# Patient Record
Sex: FEMALE | Race: WHITE | Employment: UNEMPLOYED | URBAN - METROPOLITAN AREA
[De-identification: names, ages, dates, MRNs, and addresses within clinical notes are randomized per-mention and may not be internally consistent; named-entity substitution may affect disease eponyms.]

---

## 2022-06-10 ENCOUNTER — OFFICE VISIT (OUTPATIENT)
Dept: FAMILY MEDICINE CLINIC | Age: 22
End: 2022-06-10
Payer: MEDICAID

## 2022-06-10 VITALS — TEMPERATURE: 98.9 F | HEART RATE: 57 BPM | OXYGEN SATURATION: 98 %

## 2022-06-10 DIAGNOSIS — K64.9 ACUTE HEMORRHOID: Primary | ICD-10-CM

## 2022-06-10 PROCEDURE — 99213 OFFICE O/P EST LOW 20 MIN: CPT

## 2022-06-10 PROCEDURE — G8419 CALC BMI OUT NRM PARAM NOF/U: HCPCS

## 2022-06-10 PROCEDURE — G8427 DOCREV CUR MEDS BY ELIG CLIN: HCPCS

## 2022-06-10 PROCEDURE — 1036F TOBACCO NON-USER: CPT

## 2022-06-10 RX ORDER — LIDOCAINE HCL AND HYDROCORTISONE ACETATE 20; 20 MG/G; MG/G
1 CREAM RECTAL 2 TIMES DAILY PRN
Qty: 1 KIT | Refills: 1 | Status: SHIPPED | OUTPATIENT
Start: 2022-06-10

## 2022-06-10 ASSESSMENT — ENCOUNTER SYMPTOMS: BLOOD IN STOOL: 1

## 2022-06-10 NOTE — PROGRESS NOTES
[facility] [address] Encounter        ASSESSMENT/PLAN     Melani Palmer is a 25 y.o. female who presents with: Complaint of traces of blood in stool that is bright red. Blood is present on the stool does not discolor the water in the bowl of the toilet. Patient gave birth approximately 4 weeks ago did have issues with hemorrhoids during pregnancy. She has not used any treatments. With chaperone present patient rectum is examined there is no external hemorrhoid noted appearance of trace Blood near the rectum suspicion for internal bleeding hemorrhoid. Though none is palpated. 1. Acute hemorrhoid      Orders placed for rectal hydrocortisone cream.  Patient educated to use twice daily. Also educated to use Metamucil or Benefiber daily to help soften stool. She has follow-up with her midwife in 1 week advised her to discuss condition at that time. She is to return if she detects large volumes of blood in the stool or toilet. PATIENT REFERRED TO:  Return if symptoms worsen or fail to improve. DISCHARGE MEDICATIONS:  New Prescriptions    LIDOCAINE-HYDROCORTISONE ACE 2-2 % KIT    Place 1 Applicatorful rectally 2 times daily as needed (For rectal pain and itching) Use sparingly     Cannot display discharge medications since this is not an admission. Mojgan Lama, APRN - CNP    CHIEF COMPLAINT       Chief Complaint   Patient presents with    Rectal Bleeding     after BM bright red         SUBJECTIVE/REVIEW OF SYSTEMS     Review of Systems   Constitutional: Negative for fatigue and fever. Respiratory: Negative for shortness of breath. Cardiovascular: Negative for chest pain. Gastrointestinal: Positive for blood in stool and rectal pain. Negative for abdominal distention, anal bleeding, constipation and diarrhea. Genitourinary: Negative for dyspareunia, hematuria, vaginal bleeding and vaginal pain. Musculoskeletal: Negative. Skin: Negative. Neurological: Negative. Hematological: Does not bruise/bleed easily. Psychiatric/Behavioral: Negative for confusion. OBJECTIVE/PHYSICAL EXAM     Physical Exam  Exam conducted with a chaperone present. Constitutional:       General: She is not in acute distress. Appearance: Normal appearance. She is not diaphoretic. Cardiovascular:      Rate and Rhythm: Normal rate. Pulmonary:      Effort: Pulmonary effort is normal.   Abdominal:      General: Abdomen is flat. There is no distension. Palpations: Abdomen is soft. Tenderness: There is no abdominal tenderness. There is no guarding. Genitourinary:     Vagina: No vaginal discharge. Rectum: Internal hemorrhoid present. No mass or external hemorrhoid. Skin:     General: Skin is warm and dry. Neurological:      Mental Status: She is alert. Psychiatric:         Mood and Affect: Mood normal.         Behavior: Behavior normal.         VITALS   , Temp: 98.9 °F (37.2 °C),  , Heart Rate: 57,  , SpO2: 98 %      PAST MEDICAL HISTORY         Diagnosis Date    Depression     IBS (irritable bowel syndrome)     Pelvic congestive syndrome      SURGICAL HISTORY     Patient  has no past surgical history on file. CURRENT MEDICATIONS       Previous Medications    No medications on file     ALLERGIES     Patient is is allergic to iodine. FAMILY HISTORY     Patient'sfamily history includes Cancer in her father; No Known Problems in her mother. HISTORY     Patient  reports that she quit smoking about 2 years ago. Her smoking use included cigarettes. She has a 3.00 pack-year smoking history. She has never used smokeless tobacco. She reports that she does not drink alcohol and does not use drugs. READY CARE COURSE   No orders of the defined types were placed in this encounter. Labs:  No results found for this visit on 06/10/22. IMAGING:  No orders to display     Scheduled Meds:  Continuous Infusions:  PRN Meds:.

## 2022-06-10 NOTE — PATIENT INSTRUCTIONS
Patient Education        Hemorroides internas y externas: [address] [facility] anatómico  Internal and External Hemorrhoids: Anatomy Sketch    Revisado: 8 septiembre, 2021               Versión del contenido: 13.2  © 2006-2022 [employer]. Las instrucciones de cuidado fueron adaptadas bajo licencia por [facility] ([facility]). Si usted tiene [place] afección médica o sobre estas instrucciones, siempre pregunte a serrano profesional de cindy. [employer] niega toda garantía o responsabilidad por serrano uso de esta información. I recommend using a daily fiber such as Metamucil or Benefiber to help soften the stool. Use rectal cream twice daily and then reduce to once daily.   If symptoms do not resolve in 1 week return for follow-up or I will see her primary care

## 2022-06-11 ASSESSMENT — ENCOUNTER SYMPTOMS
RECTAL PAIN: 1
ANAL BLEEDING: 0
SHORTNESS OF BREATH: 0
DIARRHEA: 0
ABDOMINAL DISTENTION: 0
CONSTIPATION: 0

## 2022-07-17 ENCOUNTER — OFFICE VISIT (OUTPATIENT)
Dept: INTERNAL MEDICINE | Age: 22
End: 2022-07-17
Payer: MEDICAID

## 2022-07-17 VITALS
TEMPERATURE: 98.3 F | HEART RATE: 72 BPM | OXYGEN SATURATION: 98 % | BODY MASS INDEX: 28.41 KG/M2 | WEIGHT: 181 LBS | SYSTOLIC BLOOD PRESSURE: 122 MMHG | HEIGHT: 67 IN | DIASTOLIC BLOOD PRESSURE: 68 MMHG

## 2022-07-17 DIAGNOSIS — B37.9 YEAST INFECTION: Primary | ICD-10-CM

## 2022-07-17 PROCEDURE — 99203 OFFICE O/P NEW LOW 30 MIN: CPT | Performed by: NURSE PRACTITIONER

## 2022-07-17 RX ORDER — FLUCONAZOLE 150 MG/1
150 TABLET ORAL ONCE
Qty: 1 TABLET | Refills: 0 | Status: SHIPPED | OUTPATIENT
Start: 2022-07-17 | End: 2022-07-17

## 2022-07-17 RX ORDER — HYDROCORTISONE 25 MG/G
CREAM TOPICAL
COMMUNITY
Start: 2022-06-10

## 2022-07-17 ASSESSMENT — PATIENT HEALTH QUESTIONNAIRE - PHQ9
3. TROUBLE FALLING OR STAYING ASLEEP: 0
2. FEELING DOWN, DEPRESSED OR HOPELESS: 0
SUM OF ALL RESPONSES TO PHQ QUESTIONS 1-9: 0
1. LITTLE INTEREST OR PLEASURE IN DOING THINGS: 0
SUM OF ALL RESPONSES TO PHQ QUESTIONS 1-9: 0
SUM OF ALL RESPONSES TO PHQ QUESTIONS 1-9: 0
4. FEELING TIRED OR HAVING LITTLE ENERGY: 0
7. TROUBLE CONCENTRATING ON THINGS, SUCH AS READING THE NEWSPAPER OR WATCHING TELEVISION: 0
SUM OF ALL RESPONSES TO PHQ QUESTIONS 1-9: 0
9. THOUGHTS THAT YOU WOULD BE BETTER OFF DEAD, OR OF HURTING YOURSELF: 0
5. POOR APPETITE OR OVEREATING: 0
10. IF YOU CHECKED OFF ANY PROBLEMS, HOW DIFFICULT HAVE THESE PROBLEMS MADE IT FOR YOU TO DO YOUR WORK, TAKE CARE OF THINGS AT HOME, OR GET ALONG WITH OTHER PEOPLE: 0
SUM OF ALL RESPONSES TO PHQ9 QUESTIONS 1 & 2: 0
6. FEELING BAD ABOUT YOURSELF - OR THAT YOU ARE A FAILURE OR HAVE LET YOURSELF OR YOUR FAMILY DOWN: 0
8. MOVING OR SPEAKING SO SLOWLY THAT OTHER PEOPLE COULD HAVE NOTICED. OR THE OPPOSITE, BEING SO FIGETY OR RESTLESS THAT YOU HAVE BEEN MOVING AROUND A LOT MORE THAN USUAL: 0

## 2022-07-17 ASSESSMENT — ENCOUNTER SYMPTOMS
DIARRHEA: 0
NAUSEA: 0
ABDOMINAL PAIN: 0
SHORTNESS OF BREATH: 0
VOMITING: 0
BACK PAIN: 0
COUGH: 0
WHEEZING: 0

## 2022-07-17 NOTE — PROGRESS NOTES
Mae De Los Santos (:  2000) is a 25 y.o. female, New patient, here for evaluation of the following chief complaint(s):  Vaginitis (Irritation, discharge ongoing 2 days)      Vitals:    22 1025   BP: 122/68   Pulse: 72   Temp: 98.3 °F (36.8 °C)   SpO2: 98%       ASSESSMENT/PLAN:  1. Yeast infection  -     fluconazole (DIFLUCAN) 150 MG tablet; Take 1 tablet by mouth once for 1 dose, Disp-1 tablet, R-0Normal        -     pt breastfeeding, advised diflucan is present in breast milk but RID is <10%. Discussed pump and dump for day as option to decrease RID (if she's more comfortable with that)    Return if symptoms worsen or fail to improve. SUBJECTIVE/OBJECTIVE:    Vaginal Discharge  The patient's primary symptoms include vaginal discharge. This is a new problem. Episode onset: x2 days, vaginal irritation and white clumpy discharge. The problem occurs constantly. The problem has been gradually worsening. The pain is mild. She is not pregnant. Pertinent negatives include no abdominal pain, back pain, chills, diarrhea, dysuria, fever, flank pain, frequency, nausea, urgency or vomiting. The vaginal discharge was thick and white. There has been no bleeding. She is sexually active. No, her partner does not have an STD. Review of Systems   Constitutional:  Negative for chills, fatigue and fever. Respiratory:  Negative for cough, shortness of breath and wheezing. Cardiovascular:  Negative for chest pain and palpitations. Gastrointestinal:  Negative for abdominal pain, diarrhea, nausea and vomiting. Genitourinary:  Positive for vaginal discharge and vaginal pain (irritation). Negative for dysuria, flank pain, frequency and urgency. Musculoskeletal:  Negative for arthralgias, back pain and myalgias. Physical Exam  Vitals reviewed. Constitutional:       General: She is not in acute distress. Appearance: Normal appearance.    Cardiovascular:      Rate and Rhythm: Normal rate and regular rhythm. Heart sounds: Normal heart sounds, S1 normal and S2 normal.   Pulmonary:      Effort: Pulmonary effort is normal. No respiratory distress. Breath sounds: Normal air entry. No decreased breath sounds, wheezing, rhonchi or rales. Abdominal:      General: Bowel sounds are normal.      Palpations: Abdomen is soft. Tenderness: There is no abdominal tenderness. There is no right CVA tenderness or left CVA tenderness. Genitourinary:     Comments: White cottage cheese like discharge with vaginal irritation per pt. Exam deferred   Skin:     General: Skin is warm and dry. Neurological:      Mental Status: She is alert and oriented to person, place, and time. Psychiatric:         Behavior: Behavior is cooperative. An electronic signature was used to authenticate this note.     --EDGARD Hernandez

## 2022-12-10 ENCOUNTER — OFFICE VISIT (OUTPATIENT)
Dept: INTERNAL MEDICINE | Age: 22
End: 2022-12-10

## 2022-12-10 VITALS
OXYGEN SATURATION: 99 % | TEMPERATURE: 97.6 F | HEIGHT: 67 IN | SYSTOLIC BLOOD PRESSURE: 118 MMHG | WEIGHT: 185 LBS | DIASTOLIC BLOOD PRESSURE: 74 MMHG | HEART RATE: 71 BPM | BODY MASS INDEX: 29.03 KG/M2

## 2022-12-10 DIAGNOSIS — R39.9 UTI SYMPTOMS: Primary | ICD-10-CM

## 2022-12-10 LAB
BILIRUBIN, POC: NORMAL
BLOOD URINE, POC: NORMAL
CLARITY, POC: NORMAL
COLOR, POC: NORMAL
GLUCOSE URINE, POC: NORMAL
KETONES, POC: NORMAL
LEUKOCYTE EST, POC: NORMAL
NITRITE, POC: NORMAL
PH, POC: 6
PROTEIN, POC: NORMAL
SPECIFIC GRAVITY, POC: 1.02
UROBILINOGEN, POC: NORMAL

## 2022-12-10 SDOH — ECONOMIC STABILITY: FOOD INSECURITY: WITHIN THE PAST 12 MONTHS, THE FOOD YOU BOUGHT JUST DIDN'T LAST AND YOU DIDN'T HAVE MONEY TO GET MORE.: NEVER TRUE

## 2022-12-10 SDOH — ECONOMIC STABILITY: FOOD INSECURITY: WITHIN THE PAST 12 MONTHS, YOU WORRIED THAT YOUR FOOD WOULD RUN OUT BEFORE YOU GOT MONEY TO BUY MORE.: NEVER TRUE

## 2022-12-10 ASSESSMENT — ENCOUNTER SYMPTOMS
VOMITING: 0
SHORTNESS OF BREATH: 0
WHEEZING: 0
DIARRHEA: 0
NAUSEA: 0
COUGH: 0
ABDOMINAL PAIN: 0

## 2022-12-10 ASSESSMENT — SOCIAL DETERMINANTS OF HEALTH (SDOH): HOW HARD IS IT FOR YOU TO PAY FOR THE VERY BASICS LIKE FOOD, HOUSING, MEDICAL CARE, AND HEATING?: NOT HARD AT ALL

## 2022-12-10 NOTE — PROGRESS NOTES
Subjective:      Patient ID: Socorro Juares is a 25 y.o. female who presents today for:  Chief Complaint   Patient presents with    Urinary Tract Infection     Pelvic pain, burning, frequency, had small blood clots in urine, yesterday       Dysuria   This is a new problem. The current episode started yesterday. The problem has been resolved. The quality of the pain is described as burning, shooting and aching. The pain is moderate. There has been no fever. Associated symptoms include frequency and hematuria. Pertinent negatives include no chills, discharge, flank pain, hesitancy, nausea, possible pregnancy, sweats, urgency or vomiting. She has tried nothing for the symptoms. There is no history of kidney stones or recurrent UTIs. Past Medical History:   Diagnosis Date    Depression     IBS (irritable bowel syndrome)     Pelvic congestive syndrome      History reviewed. No pertinent surgical history. Family History   Problem Relation Age of Onset    No Known Problems Mother     Cancer Father      Allergies   Allergen Reactions    Iodine Hives and Itching     Facial hives, pruritus            Review of Systems   Constitutional:  Negative for chills, fatigue and fever. Respiratory:  Negative for cough, shortness of breath and wheezing. Cardiovascular:  Negative for chest pain. Gastrointestinal:  Negative for abdominal pain, diarrhea, nausea and vomiting. Genitourinary:  Positive for dysuria, frequency, hematuria and pelvic pain. Negative for flank pain, hesitancy and urgency. Objective:   /74 (Site: Right Upper Arm, Position: Sitting, Cuff Size: Medium Adult)   Pulse 71   Temp 97.6 °F (36.4 °C) (Infrared)   Ht 5' 7\" (1.702 m)   Wt 185 lb (83.9 kg)   SpO2 99%   BMI 28.98 kg/m²     Physical Exam  Vitals reviewed. Constitutional:       General: She is not in acute distress. Appearance: She is well-developed. She is not ill-appearing. HENT:      Head: Normocephalic. Cardiovascular:      Rate and Rhythm: Normal rate and regular rhythm. Heart sounds: Normal heart sounds. Pulmonary:      Effort: Pulmonary effort is normal. No respiratory distress. Breath sounds: Normal breath sounds. Abdominal:      General: Bowel sounds are normal.      Palpations: Abdomen is soft. Tenderness: There is no abdominal tenderness. There is no right CVA tenderness or left CVA tenderness. Musculoskeletal:         General: Normal range of motion. Skin:     General: Skin is warm and dry. Neurological:      Mental Status: She is alert and oriented to person, place, and time. Assessment:       Diagnosis Orders   1. UTI symptoms  POCT Urinalysis No Micro (Auto)    Culture, Urine            Plan:      Orders Placed This Encounter   Procedures    Culture, Urine     Standing Status:   Future     Standing Expiration Date:   12/10/2023     Order Specific Question:   Specify (ex-cath, midstream, cysto, etc)? Answer:   Midstream    POCT Urinalysis No Micro (Auto)     UA does not confirm a UTI. Will treat upon culture results as indicated. Patient is not experiencing symptoms at this time. Reviewed symptoms requiring follow up/ER. Patient verbalizes understanding. I have reviewed and updated the electronic medical record. Return if symptoms worsen or fail to improve, for follow up with PCP.     EDGARD Iglesias NP

## 2022-12-11 DIAGNOSIS — R39.9 UTI SYMPTOMS: ICD-10-CM

## 2022-12-13 LAB
ORGANISM: ABNORMAL
URINE CULTURE, ROUTINE: ABNORMAL
URINE CULTURE, ROUTINE: ABNORMAL

## 2022-12-15 RX ORDER — NITROFURANTOIN 25; 75 MG/1; MG/1
100 CAPSULE ORAL 2 TIMES DAILY
Qty: 10 CAPSULE | Refills: 0 | Status: SHIPPED | OUTPATIENT
Start: 2022-12-15 | End: 2022-12-20

## 2023-02-03 SDOH — HEALTH STABILITY: PHYSICAL HEALTH: ON AVERAGE, HOW MANY DAYS PER WEEK DO YOU ENGAGE IN MODERATE TO STRENUOUS EXERCISE (LIKE A BRISK WALK)?: 0 DAYS

## 2023-02-03 SDOH — HEALTH STABILITY: PHYSICAL HEALTH: ON AVERAGE, HOW MANY MINUTES DO YOU ENGAGE IN EXERCISE AT THIS LEVEL?: 0 MIN

## 2023-02-06 ENCOUNTER — OFFICE VISIT (OUTPATIENT)
Dept: INTERNAL MEDICINE | Age: 23
End: 2023-02-06
Payer: MEDICAID

## 2023-02-06 VITALS
OXYGEN SATURATION: 99 % | SYSTOLIC BLOOD PRESSURE: 114 MMHG | HEART RATE: 71 BPM | RESPIRATION RATE: 16 BRPM | TEMPERATURE: 97.2 F | BODY MASS INDEX: 29.73 KG/M2 | WEIGHT: 189.4 LBS | HEIGHT: 67 IN | DIASTOLIC BLOOD PRESSURE: 88 MMHG

## 2023-02-06 DIAGNOSIS — R07.9 CHEST PAIN, UNSPECIFIED TYPE: ICD-10-CM

## 2023-02-06 DIAGNOSIS — R10.2 CHRONIC SUPRAPUBIC PAIN: Primary | ICD-10-CM

## 2023-02-06 DIAGNOSIS — K59.00 CONSTIPATION, UNSPECIFIED CONSTIPATION TYPE: ICD-10-CM

## 2023-02-06 DIAGNOSIS — N94.89 PELVIC CONGESTION SYNDROME: ICD-10-CM

## 2023-02-06 DIAGNOSIS — G89.29 CHRONIC SUPRAPUBIC PAIN: Primary | ICD-10-CM

## 2023-02-06 DIAGNOSIS — M62.89 MUSCLE TIGHTNESS: ICD-10-CM

## 2023-02-06 LAB
BILIRUBIN, POC: NORMAL
BLOOD URINE, POC: NORMAL
CLARITY, POC: CLEAR
COLOR, POC: NORMAL
GLUCOSE URINE, POC: NORMAL
KETONES, POC: NORMAL
LEUKOCYTE EST, POC: NORMAL
NITRITE, POC: NORMAL
PH, POC: 6.5
PROTEIN, POC: NORMAL
SPECIFIC GRAVITY, POC: 1.02
UROBILINOGEN, POC: 0.2

## 2023-02-06 PROCEDURE — G8427 DOCREV CUR MEDS BY ELIG CLIN: HCPCS | Performed by: PHYSICIAN ASSISTANT

## 2023-02-06 PROCEDURE — 81003 URINALYSIS AUTO W/O SCOPE: CPT | Performed by: PHYSICIAN ASSISTANT

## 2023-02-06 PROCEDURE — 93000 ELECTROCARDIOGRAM COMPLETE: CPT | Performed by: PHYSICIAN ASSISTANT

## 2023-02-06 PROCEDURE — 99203 OFFICE O/P NEW LOW 30 MIN: CPT | Performed by: PHYSICIAN ASSISTANT

## 2023-02-06 PROCEDURE — 1036F TOBACCO NON-USER: CPT | Performed by: PHYSICIAN ASSISTANT

## 2023-02-06 PROCEDURE — G8419 CALC BMI OUT NRM PARAM NOF/U: HCPCS | Performed by: PHYSICIAN ASSISTANT

## 2023-02-06 PROCEDURE — G8484 FLU IMMUNIZE NO ADMIN: HCPCS | Performed by: PHYSICIAN ASSISTANT

## 2023-02-06 SDOH — ECONOMIC STABILITY: FOOD INSECURITY: WITHIN THE PAST 12 MONTHS, YOU WORRIED THAT YOUR FOOD WOULD RUN OUT BEFORE YOU GOT MONEY TO BUY MORE.: NEVER TRUE

## 2023-02-06 SDOH — ECONOMIC STABILITY: INCOME INSECURITY: HOW HARD IS IT FOR YOU TO PAY FOR THE VERY BASICS LIKE FOOD, HOUSING, MEDICAL CARE, AND HEATING?: NOT HARD AT ALL

## 2023-02-06 SDOH — ECONOMIC STABILITY: FOOD INSECURITY: WITHIN THE PAST 12 MONTHS, THE FOOD YOU BOUGHT JUST DIDN'T LAST AND YOU DIDN'T HAVE MONEY TO GET MORE.: NEVER TRUE

## 2023-02-06 SDOH — ECONOMIC STABILITY: HOUSING INSECURITY
IN THE LAST 12 MONTHS, WAS THERE A TIME WHEN YOU DID NOT HAVE A STEADY PLACE TO SLEEP OR SLEPT IN A SHELTER (INCLUDING NOW)?: NO

## 2023-02-06 ASSESSMENT — PATIENT HEALTH QUESTIONNAIRE - PHQ9
SUM OF ALL RESPONSES TO PHQ9 QUESTIONS 1 & 2: 0
SUM OF ALL RESPONSES TO PHQ QUESTIONS 1-9: 0
SUM OF ALL RESPONSES TO PHQ QUESTIONS 1-9: 0
1. LITTLE INTEREST OR PLEASURE IN DOING THINGS: 0
SUM OF ALL RESPONSES TO PHQ QUESTIONS 1-9: 0
2. FEELING DOWN, DEPRESSED OR HOPELESS: 0
SUM OF ALL RESPONSES TO PHQ QUESTIONS 1-9: 0

## 2023-02-06 NOTE — PROGRESS NOTES
Yong Riggins (: 2000) is a 25 y.o. female, Established patient, here for evaluation of the following chief complaint(s):  New Patient (Est care, pelvic pain hurts all the time, joint pain (knees down to ankles), chest pain issues for a while but it has been more frequent recently)        ASSESSMENT/PLAN:  1. Chronic suprapubic pain  2. Pelvic congestion syndrome  3. Constipation, unspecified constipation type  - POCT Urinalysis No Micro (Auto)- neg   - 2 providers have been unsure what is causing her pain, constipation vs pelvic congestion syndrome   - advised her to start Metamucil regimen with increased water intake. Can us Miralax if needed. When BM are normal , and pain continues, she should see the gyn again from treatment      4. Chest pain, unspecified type  - EKG 12 lead-  NSR with short TX interval . Will have her see the cardiologist   - Ambulatory referral to Cardiology    5. Muscle tightness  - normal exam of the knes and ankles  - advised yoga or piliates, or just stretching          No follow-ups on file. SUBJECTIVE/OBJECTIVE:  HPI      Suprapubic pain x 1 yr  GYN visit , Had US , was normal   GI saw her- diagnosed with IBS-C  Then had CT, showed pelvic congestion syndrome    Unsure if the pain is due to pelvic congestion or the constipation   She is nursing       Joint pain /muscle  Knees to the ankles   Just feels tight     Chest pain   For months, but now getting more frequent   Last a few minutes, no SOB    Can be without exertion       Review of Systems   Constitutional: Negative. HENT: Negative. Respiratory: Negative. Cardiovascular:  Positive for chest pain. Negative for palpitations and leg swelling. Gastrointestinal:  Positive for abdominal pain (suprapubic). Musculoskeletal:  Positive for arthralgias and myalgias. Negative for gait problem and joint swelling. Neurological: Negative. Hematological: Negative. Psychiatric/Behavioral: Negative.      All other systems reviewed and are negative. Physical Exam  Vitals reviewed. Constitutional:       Appearance: Normal appearance. Cardiovascular:      Rate and Rhythm: Normal rate and regular rhythm. Heart sounds: Normal heart sounds. Pulmonary:      Effort: Pulmonary effort is normal.      Breath sounds: Normal breath sounds. Abdominal:      General: Bowel sounds are normal.      Tenderness: There is abdominal tenderness (suprapubic). Musculoskeletal:      Right knee: Normal.      Left knee: Normal.      Right ankle: Normal.      Left ankle: Normal.   Neurological:      Mental Status: She is alert. Vitals:    02/06/23 1007   BP: 114/88   Site: Right Upper Arm   Position: Sitting   Cuff Size: Medium Adult   Pulse: 71   Resp: 16   Temp: 97.2 °F (36.2 °C)   SpO2: 99%   Weight: 189 lb 6.4 oz (85.9 kg)   Height: 5' 7\" (1.702 m)                 An electronic signature was used to authenticate this note.     --HELEN Richards

## 2023-02-07 ASSESSMENT — ENCOUNTER SYMPTOMS
RESPIRATORY NEGATIVE: 1
ABDOMINAL PAIN: 1

## 2023-02-21 ENCOUNTER — OFFICE VISIT (OUTPATIENT)
Dept: CARDIOLOGY CLINIC | Age: 23
End: 2023-02-21
Payer: MEDICAID

## 2023-02-21 VITALS
DIASTOLIC BLOOD PRESSURE: 80 MMHG | BODY MASS INDEX: 29.82 KG/M2 | SYSTOLIC BLOOD PRESSURE: 110 MMHG | HEART RATE: 87 BPM | OXYGEN SATURATION: 99 % | WEIGHT: 190 LBS | HEIGHT: 67 IN

## 2023-02-21 DIAGNOSIS — R07.9 CHEST PAIN, UNSPECIFIED TYPE: Primary | ICD-10-CM

## 2023-02-21 PROCEDURE — 93000 ELECTROCARDIOGRAM COMPLETE: CPT | Performed by: INTERNAL MEDICINE

## 2023-02-21 PROCEDURE — G8484 FLU IMMUNIZE NO ADMIN: HCPCS | Performed by: INTERNAL MEDICINE

## 2023-02-21 PROCEDURE — 99204 OFFICE O/P NEW MOD 45 MIN: CPT | Performed by: INTERNAL MEDICINE

## 2023-02-21 PROCEDURE — 4004F PT TOBACCO SCREEN RCVD TLK: CPT | Performed by: INTERNAL MEDICINE

## 2023-02-21 PROCEDURE — G8427 DOCREV CUR MEDS BY ELIG CLIN: HCPCS | Performed by: INTERNAL MEDICINE

## 2023-02-21 PROCEDURE — G8419 CALC BMI OUT NRM PARAM NOF/U: HCPCS | Performed by: INTERNAL MEDICINE

## 2023-02-21 NOTE — PROGRESS NOTES
2023    EDGARD Espinoza CNP  [address] [place] [address]    RE: Prieto Yoder   : 2000     Dear EDGARD Minor CNP :    I had the pleasure of seeing your patient, Prieto Yoder in the office today on 2023. As you are well aware, Ms. Sydney Vincent is a pleasant 25 y.o.  female with history of tobacco abuse and recent pregnancy 9 who was kindly referred to me for evaluation of chest pain. Currently, the patient reports a left sided chest pain described as a \"sharp/stabbing\"sensation over the last month. Episodes can happen almost every day. The pain can last between 2 seconds and 2 minutes. The discomfort radiates down the left lateral chest wall. The pain causes shortness of breath due to breathing shallow. There is no associated nausea, vomiting, paresthesias or diaphoresis. No significant palpitations are reported. No near syncope or syncope. She reports dyspnea with more physical degrees of activity. No prior history of heart disease. Past Medical History: She  has a past medical history of Depression, IBS (irritable bowel syndrome), Pelvic congestive syndrome, and Urinary incontinence. Surgical History: None. Social History: She  reports that she has been smoking cigarettes. She has a 1.50 pack-year smoking history. She has never used smokeless tobacco. She reports that she does not drink alcohol and does not use drugs. She drink 12 oz coffee per day. Family History: Brother with heart murmur. Maternal grandmother with unspecified heart disease and CVA. Maternal grandfather with history of amyloid heart disease and pacemaker. Current medications:   No current outpatient medications on file. No current facility-administered medications for this visit. Allergies: Iodine     Review of Systems   General: Fatigued. Cardiovascular: See HPI. No orthopnea or PND.    Respiratory: Dyspnea is present with mild to moderate degrees of activity. Gastrointestinal: No melena or hematochezia. Genitourinary: No hematuria. Hematological: No easy bruising or bleeding. Vascular: No lower extremity edema or claudication. Neurological: No TIA or CVA symptoms. No paresthesias. Musculoskeletal: No chest wall trauma. Psychiatric: No anxiety. + depression   *All other systems were reviewed and found to be negative unless otherwise noted in the HPI*    Physical Examination: Her  height is 5' 7\" (1.702 m) and weight is 190 lb (86.2 kg). Her blood pressure is 110/80 and her pulse is 87. Her oxygen saturation is 99%. .  She is alert and oriented to person, place, and time. No distress. Head is atraumatic. Moist mucous membranes. Neck is supple without JVD or carotid bruits. No thyroidmegaly. Lungs are clear to auscultation both anteriorly and posteriorly. No accessory muscle usage. Heart is a regular rate and rhythm. No significant murmur. No S3 or S4. PMI is nondisplaced. Abdomen is soft and non tender. No hepatosplenomegaly. No abdominal aortic bruits or masses. Lower extremities are free of edema. No clubbing or cyanosis. Neurologically, cranial nerves are grossly intact. No focal sensory and/or motor deficits. Skin is intact without rash or lesions. ECG (2/21/2023): Sinus rhythm. HR 66 bpm. Short WV interval of 90 msec. Nonspecific T wave changes. IMPRESSION:  Functional class II- III (mild to moderate activity) dyspnea on exertion, rule out structural heart disease like post partum cardiomyopathy or amyloid heart disease. Precordial pain, atypical. Possible musculokeletal etiology. Tobacco abuse  Family history of amyloid heart disease. Recent pregnancy. RECOMMENDATIONS:  Given her risk factors and symptoms, Ms. Piedad Soriano is recommended an echocardiogram for structural heart evaluation. Her chest pain is most suggestive of musculoskeletal etiology but given recent pregnancy we will make sure there is no other etiology.  She is agreeable to the plan. Follow up cardiac evaluation in 6 weeks. Thank you very much for allowing me to participate in Ms. Haddad's cardiac care. Should you have any questions, please do not hesitate to contact me.      Sincerely,    Selvin Brennan DO, [facility], [address]

## 2023-03-14 ENCOUNTER — OFFICE VISIT (OUTPATIENT)
Dept: INTERNAL MEDICINE | Age: 23
End: 2023-03-14
Payer: MEDICAID

## 2023-03-14 ENCOUNTER — HOSPITAL ENCOUNTER (OUTPATIENT)
Dept: NON INVASIVE DIAGNOSTICS | Age: 23
Discharge: HOME | End: 2023-03-14
Payer: MEDICAID

## 2023-03-14 ENCOUNTER — HOSPITAL ENCOUNTER (OUTPATIENT)
Dept: LAB | Age: 23
Discharge: HOME | End: 2023-03-14
Payer: MEDICAID

## 2023-03-14 VITALS
BODY MASS INDEX: 29.66 KG/M2 | DIASTOLIC BLOOD PRESSURE: 74 MMHG | OXYGEN SATURATION: 99 % | WEIGHT: 189 LBS | TEMPERATURE: 97 F | RESPIRATION RATE: 16 BRPM | HEART RATE: 73 BPM | SYSTOLIC BLOOD PRESSURE: 104 MMHG | HEIGHT: 67 IN

## 2023-03-14 DIAGNOSIS — M25.50 ARTHRALGIA, UNSPECIFIED JOINT: ICD-10-CM

## 2023-03-14 DIAGNOSIS — Z83.49 FAMILY HISTORY OF AMYLOIDOSIS: ICD-10-CM

## 2023-03-14 DIAGNOSIS — M79.18 MYALGIA, MULTIPLE SITES: ICD-10-CM

## 2023-03-14 DIAGNOSIS — R07.9 CHEST PAIN, UNSPECIFIED TYPE: ICD-10-CM

## 2023-03-14 DIAGNOSIS — M25.50 ARTHRALGIA, UNSPECIFIED JOINT: Primary | ICD-10-CM

## 2023-03-14 LAB
ALBUMIN SERPL-MCNC: 4.8 G/DL (ref 3.5–4.6)
ALP BLD-CCNC: 55 U/L (ref 40–130)
ALT SERPL-CCNC: 14 U/L (ref 0–33)
ANION GAP SERPL CALCULATED.3IONS-SCNC: 11 MEQ/L (ref 9–15)
AST SERPL-CCNC: 22 U/L (ref 0–35)
BASOPHILS ABSOLUTE: 0 K/UL (ref 0–0.2)
BASOPHILS RELATIVE PERCENT: 0.7 %
BILIRUB SERPL-MCNC: <0.2 MG/DL (ref 0.2–0.7)
BUN BLDV-MCNC: 10 MG/DL (ref 6–20)
CALCIUM SERPL-MCNC: 9.5 MG/DL (ref 8.5–9.9)
CHLORIDE BLD-SCNC: 107 MEQ/L (ref 95–107)
CO2: 25 MEQ/L (ref 20–31)
CREAT SERPL-MCNC: 0.65 MG/DL (ref 0.5–0.9)
EOSINOPHILS ABSOLUTE: 0.1 K/UL (ref 0–0.7)
EOSINOPHILS RELATIVE PERCENT: 1.3 %
GFR SERPL CREATININE-BSD FRML MDRD: >60 ML/MIN/{1.73_M2}
GLOBULIN: 2.8 G/DL (ref 2.3–3.5)
GLUCOSE BLD-MCNC: 84 MG/DL (ref 70–99)
HCT VFR BLD CALC: 42.4 % (ref 37–47)
HEMOGLOBIN: 14.2 G/DL (ref 12–16)
LV EF: 50 %
LVEF MODALITY: NORMAL
LYMPHOCYTES ABSOLUTE: 2.3 K/UL (ref 1–4.8)
LYMPHOCYTES RELATIVE PERCENT: 35.1 %
MCH RBC QN AUTO: 29.8 PG (ref 27–31.3)
MCHC RBC AUTO-ENTMCNC: 33.4 % (ref 33–37)
MCV RBC AUTO: 89.2 FL (ref 79.4–94.8)
MONOCYTES ABSOLUTE: 0.5 K/UL (ref 0.2–0.8)
MONOCYTES RELATIVE PERCENT: 8.3 %
NEUTROPHILS ABSOLUTE: 3.6 K/UL (ref 1.4–6.5)
NEUTROPHILS RELATIVE PERCENT: 54.6 %
PDW BLD-RTO: 13.2 % (ref 11.5–14.5)
PLATELET # BLD: 249 K/UL (ref 130–400)
POTASSIUM SERPL-SCNC: 4.4 MEQ/L (ref 3.4–4.9)
RBC # BLD: 4.76 M/UL (ref 4.2–5.4)
SODIUM BLD-SCNC: 143 MEQ/L (ref 135–144)
TOTAL PROTEIN: 7.6 G/DL (ref 6.3–8)
WBC # BLD: 6.5 K/UL (ref 4.8–10.8)

## 2023-03-14 PROCEDURE — 4004F PT TOBACCO SCREEN RCVD TLK: CPT | Performed by: PHYSICIAN ASSISTANT

## 2023-03-14 PROCEDURE — 84165 PROTEIN E-PHORESIS SERUM: CPT

## 2023-03-14 PROCEDURE — G8484 FLU IMMUNIZE NO ADMIN: HCPCS | Performed by: PHYSICIAN ASSISTANT

## 2023-03-14 PROCEDURE — 85025 COMPLETE CBC W/AUTO DIFF WBC: CPT

## 2023-03-14 PROCEDURE — 83520 IMMUNOASSAY QUANT NOS NONAB: CPT

## 2023-03-14 PROCEDURE — 99213 OFFICE O/P EST LOW 20 MIN: CPT | Performed by: PHYSICIAN ASSISTANT

## 2023-03-14 PROCEDURE — 84155 ASSAY OF PROTEIN SERUM: CPT

## 2023-03-14 PROCEDURE — G8427 DOCREV CUR MEDS BY ELIG CLIN: HCPCS | Performed by: PHYSICIAN ASSISTANT

## 2023-03-14 PROCEDURE — G8419 CALC BMI OUT NRM PARAM NOF/U: HCPCS | Performed by: PHYSICIAN ASSISTANT

## 2023-03-14 PROCEDURE — 93306 TTE W/DOPPLER COMPLETE: CPT

## 2023-03-14 PROCEDURE — 80053 COMPREHEN METABOLIC PANEL: CPT

## 2023-03-14 PROCEDURE — 36415 COLL VENOUS BLD VENIPUNCTURE: CPT

## 2023-03-14 NOTE — PROGRESS NOTES
lb (85.7 kg)   Height: 5' 7\" (1.702 m)                 An electronic signature was used to authenticate this note.     --HELEN James

## 2023-03-16 LAB
DEPRECATED KAPPA LC FREE/LAMBDA SER: 1.3 {RATIO} (ref 0.26–1.65)
KAPPA LC FREE SER-MCNC: 13.5 MG/L (ref 3.3–19.4)
LAMBDA LC FREE SERPL-MCNC: 10.39 MG/L (ref 5.71–26.3)

## 2023-03-18 LAB
ALBUMIN SERPL-MCNC: 4.8 G/DL (ref 3.75–5.01)
ALPHA1 GLOB SERPL ELPH-MCNC: 0.28 G/DL (ref 0.19–0.46)
ALPHA2 GLOB SERPL ELPH-MCNC: 0.79 G/DL (ref 0.48–1.05)
B-GLOBULIN SERPL ELPH-MCNC: 0.85 G/DL (ref 0.48–1.1)
GAMMA GLOB SERPL ELPH-MCNC: 0.99 G/DL (ref 0.62–1.51)
INTERPRETATION SERPL IFE-IMP: NORMAL
PROT SERPL-MCNC: 7.7 G/DL (ref 6.3–8.2)
PROTEIN ELECTROPHORESIS, SERUM: NORMAL

## 2023-03-21 ENCOUNTER — OFFICE VISIT (OUTPATIENT)
Dept: CARDIOLOGY CLINIC | Age: 23
End: 2023-03-21
Payer: MEDICAID

## 2023-03-21 VITALS
DIASTOLIC BLOOD PRESSURE: 68 MMHG | HEART RATE: 73 BPM | HEIGHT: 67 IN | OXYGEN SATURATION: 100 % | WEIGHT: 189 LBS | BODY MASS INDEX: 29.66 KG/M2 | SYSTOLIC BLOOD PRESSURE: 110 MMHG

## 2023-03-21 DIAGNOSIS — R07.9 CHEST PAIN, UNSPECIFIED TYPE: Primary | ICD-10-CM

## 2023-03-21 PROCEDURE — 4004F PT TOBACCO SCREEN RCVD TLK: CPT | Performed by: INTERNAL MEDICINE

## 2023-03-21 PROCEDURE — 99213 OFFICE O/P EST LOW 20 MIN: CPT | Performed by: INTERNAL MEDICINE

## 2023-03-21 PROCEDURE — G8427 DOCREV CUR MEDS BY ELIG CLIN: HCPCS | Performed by: INTERNAL MEDICINE

## 2023-03-21 PROCEDURE — G8419 CALC BMI OUT NRM PARAM NOF/U: HCPCS | Performed by: INTERNAL MEDICINE

## 2023-03-21 PROCEDURE — G8484 FLU IMMUNIZE NO ADMIN: HCPCS | Performed by: INTERNAL MEDICINE

## 2023-03-21 ASSESSMENT — ENCOUNTER SYMPTOMS: RESPIRATORY NEGATIVE: 1

## 2023-03-21 NOTE — PROGRESS NOTES
otherwise noted in the HPI*    Physical Examination: Her  height is 5' 7\" (1.702 m) and weight is 189 lb (85.7 kg). Her blood pressure is 110/68 and her pulse is 73. Her oxygen saturation is 100%. She is alert and oriented to person, place, and time. No distress. Head is atraumatic. Moist mucous membranes. Neck is supple without JVD or carotid bruits. No thyroidmegaly. Lungs are clear to auscultation both anteriorly and posteriorly. No accessory muscle usage. Heart is a regular rate and rhythm. No significant murmur. No S3 or S4. PMI is nondisplaced. Abdomen is soft and non tender. No hepatosplenomegaly. No abdominal aortic bruits or masses. Lower extremities are free of edema. No clubbing or cyanosis. Neurologically, cranial nerves are grossly intact. No focal sensory and/or motor deficits. Skin is intact without rash or lesions. ECG (3/21/2023): Not done today. Prior office ECG: Sinus rhythm. HR 66 bpm. Short NM interval of 90 msec. Nonspecific T wave changes. Echocardiogram (3/14/2023): EF 50%. Mild mitral regurgitation. Normal wall thickness. Normal RVSP. IMPRESSION:  Dyspnea on exertion. No evidence of postpartum cardiomyopathy or amyloid cardiomyopathy. Normal LV systolic function, EF 26%  Precordial pain, atypical.  Likely musculokeletal etiology. Tobacco abuse  Family history of amyloid heart disease. Recent pregnancy. RECOMMENDATIONS:  Ms. Robert Vega appears to be reasonably stable from a cardiac standpoint. Her chest pain is most suggestive of musculoskeletal etiology. Her echocardiogram shows normal LV function without evidence of amyloid heart disease. She is still concerned about the potential for having amyloidosis. I have recommended she follow-up with your office for further work-up for amyloidosis and/or referral to rheumatology for further evaluation. She is agreeable to the plan. Follow up cardiac evaluation as needed.     Thank you very much for allowing

## 2023-03-30 DIAGNOSIS — Z83.49 FAMILY HISTORY OF AMYLOIDOSIS: ICD-10-CM

## 2023-03-30 DIAGNOSIS — M25.50 ARTHRALGIA, UNSPECIFIED JOINT: Primary | ICD-10-CM

## 2023-03-30 DIAGNOSIS — M79.18 MYALGIA, MULTIPLE SITES: ICD-10-CM

## 2023-04-06 DIAGNOSIS — M79.18 MYALGIA, MULTIPLE SITES: ICD-10-CM

## 2023-04-06 DIAGNOSIS — Z83.49 FAMILY HISTORY OF AMYLOIDOSIS: ICD-10-CM

## 2023-04-06 DIAGNOSIS — M25.50 ARTHRALGIA, UNSPECIFIED JOINT: Primary | ICD-10-CM

## 2023-04-20 ENCOUNTER — TELEPHONE (OUTPATIENT)
Dept: INTERNAL MEDICINE | Age: 23
End: 2023-04-20

## 2023-09-19 ENCOUNTER — OFFICE VISIT (OUTPATIENT)
Dept: FAMILY MEDICINE CLINIC | Age: 23
End: 2023-09-19
Payer: MEDICAID

## 2023-09-19 VITALS
SYSTOLIC BLOOD PRESSURE: 116 MMHG | WEIGHT: 190.4 LBS | OXYGEN SATURATION: 99 % | TEMPERATURE: 98 F | BODY MASS INDEX: 29.82 KG/M2 | RESPIRATION RATE: 16 BRPM | HEART RATE: 74 BPM | DIASTOLIC BLOOD PRESSURE: 74 MMHG

## 2023-09-19 DIAGNOSIS — N89.8 VAGINAL DISCHARGE: Primary | ICD-10-CM

## 2023-09-19 LAB
BILIRUBIN, POC: ABNORMAL
BLOOD URINE, POC: ABNORMAL
CLARITY, POC: ABNORMAL
COLOR, POC: YELLOW
CONTROL: NORMAL
GLUCOSE URINE, POC: ABNORMAL
KETONES, POC: ABNORMAL
LEUKOCYTE EST, POC: 500
NITRITE, POC: ABNORMAL
PH, POC: 7
PREGNANCY TEST URINE, POC: NORMAL
PROTEIN, POC: ABNORMAL
SPECIFIC GRAVITY, POC: 1.02
UROBILINOGEN, POC: 0.2

## 2023-09-19 PROCEDURE — 4004F PT TOBACCO SCREEN RCVD TLK: CPT | Performed by: NURSE PRACTITIONER

## 2023-09-19 PROCEDURE — G8427 DOCREV CUR MEDS BY ELIG CLIN: HCPCS | Performed by: NURSE PRACTITIONER

## 2023-09-19 PROCEDURE — 99213 OFFICE O/P EST LOW 20 MIN: CPT | Performed by: NURSE PRACTITIONER

## 2023-09-19 PROCEDURE — 81003 URINALYSIS AUTO W/O SCOPE: CPT | Performed by: NURSE PRACTITIONER

## 2023-09-19 PROCEDURE — 81025 URINE PREGNANCY TEST: CPT | Performed by: NURSE PRACTITIONER

## 2023-09-19 PROCEDURE — G8419 CALC BMI OUT NRM PARAM NOF/U: HCPCS | Performed by: NURSE PRACTITIONER

## 2023-09-19 RX ORDER — FLUCONAZOLE 150 MG/1
150 TABLET ORAL ONCE
Qty: 1 TABLET | Refills: 0 | Status: SHIPPED | OUTPATIENT
Start: 2023-09-19 | End: 2023-09-19

## 2023-09-19 ASSESSMENT — ENCOUNTER SYMPTOMS
SHORTNESS OF BREATH: 0
VOMITING: 0
ABDOMINAL PAIN: 0
DIARRHEA: 0
WHEEZING: 0
BACK PAIN: 0
COUGH: 0
NAUSEA: 0

## 2024-02-12 ENCOUNTER — OFFICE VISIT (OUTPATIENT)
Dept: INTERNAL MEDICINE | Age: 24
End: 2024-02-12
Payer: MEDICAID

## 2024-02-12 VITALS
DIASTOLIC BLOOD PRESSURE: 66 MMHG | OXYGEN SATURATION: 98 % | SYSTOLIC BLOOD PRESSURE: 106 MMHG | WEIGHT: 187.6 LBS | BODY MASS INDEX: 29.44 KG/M2 | HEART RATE: 82 BPM | HEIGHT: 67 IN | RESPIRATION RATE: 16 BRPM | TEMPERATURE: 97.1 F

## 2024-02-12 DIAGNOSIS — M25.50 POLYARTHRALGIA: ICD-10-CM

## 2024-02-12 DIAGNOSIS — R53.1 WEAKNESS: Primary | ICD-10-CM

## 2024-02-12 PROCEDURE — 99213 OFFICE O/P EST LOW 20 MIN: CPT | Performed by: PHYSICIAN ASSISTANT

## 2024-02-12 PROCEDURE — 4004F PT TOBACCO SCREEN RCVD TLK: CPT | Performed by: PHYSICIAN ASSISTANT

## 2024-02-12 PROCEDURE — G8484 FLU IMMUNIZE NO ADMIN: HCPCS | Performed by: PHYSICIAN ASSISTANT

## 2024-02-12 PROCEDURE — G8427 DOCREV CUR MEDS BY ELIG CLIN: HCPCS | Performed by: PHYSICIAN ASSISTANT

## 2024-02-12 PROCEDURE — G8419 CALC BMI OUT NRM PARAM NOF/U: HCPCS | Performed by: PHYSICIAN ASSISTANT

## 2024-02-12 RX ORDER — SODIUM FLUORIDE 6 MG/ML
PASTE, DENTIFRICE DENTAL
COMMUNITY
Start: 2024-01-31

## 2024-02-12 NOTE — PROGRESS NOTES
Arcelia Haddad (: 2000) is a 23 y.o. female, Established patient, here for evaluation of the following chief complaint(s):  Joint Pain (Joint pain in her knees, tingling, weakness in legs ongoing over 1 year.  Was diagnosed with Arthralgia but was told she was to young to be experiencing symptoms (by specialists at [facility] and [facility])  )        ASSESSMENT/PLAN:  1. Weakness  2. Polyarthralgia  - reviewed all labs done, all were normal   - Ambulatory referral to Cardiology      No follow-ups on file.    SUBJECTIVE/OBJECTIVE:  HPI      Polyarthralgia x 1 yr  Knee pain. Tingling and weakness in the legs   Has been to specialist at  and [facility]  All autoimmune testing was negative    Many labs done- all normal   FH- amyloidosis , and all o her testing has been negative  CP in the past and seen cardiology   ECHO and EKG was normal   Pt is asking to see cardiologist again for another tilt table test, and to be checked for POTS     Review of Systems   Constitutional: Negative.    HENT: Negative.     Respiratory: Negative.     Cardiovascular: Negative.    Musculoskeletal:  Positive for arthralgias. Negative for joint swelling.   Neurological:  Positive for weakness.   Psychiatric/Behavioral: Negative.     All other systems reviewed and are negative.      Physical Exam  Vitals reviewed.   Constitutional:       Appearance: Normal appearance.   HENT:      Head: Normocephalic.   Cardiovascular:      Rate and Rhythm: Normal rate.   Pulmonary:      Effort: Pulmonary effort is normal.   Neurological:      General: No focal deficit present.      Mental Status: She is alert.   Psychiatric:         Mood and Affect: Mood normal.         Vitals:    24 0750   BP: 106/66   Site: Right Upper Arm   Position: Sitting   Cuff Size: Medium Adult   Pulse: 82   Resp: 16   Temp: 97.1 °F (36.2 °C)   SpO2: 98%   Weight: 85.1 kg (187 lb 9.6 oz)   Height: 1.702 m (5' 7\")                 An electronic signature was used to authenticate

## 2024-03-07 ENCOUNTER — OFFICE VISIT (OUTPATIENT)
Dept: CARDIOLOGY CLINIC | Age: 24
End: 2024-03-07
Payer: MEDICAID

## 2024-03-07 VITALS
BODY MASS INDEX: 28.72 KG/M2 | DIASTOLIC BLOOD PRESSURE: 82 MMHG | HEART RATE: 67 BPM | WEIGHT: 183 LBS | SYSTOLIC BLOOD PRESSURE: 110 MMHG | OXYGEN SATURATION: 98 % | HEIGHT: 67 IN

## 2024-03-07 DIAGNOSIS — R00.0 TACHYCARDIA: Primary | ICD-10-CM

## 2024-03-07 PROCEDURE — 4004F PT TOBACCO SCREEN RCVD TLK: CPT | Performed by: INTERNAL MEDICINE

## 2024-03-07 PROCEDURE — 93000 ELECTROCARDIOGRAM COMPLETE: CPT | Performed by: INTERNAL MEDICINE

## 2024-03-07 PROCEDURE — G8484 FLU IMMUNIZE NO ADMIN: HCPCS | Performed by: INTERNAL MEDICINE

## 2024-03-07 PROCEDURE — G8427 DOCREV CUR MEDS BY ELIG CLIN: HCPCS | Performed by: INTERNAL MEDICINE

## 2024-03-07 PROCEDURE — 99214 OFFICE O/P EST MOD 30 MIN: CPT | Performed by: INTERNAL MEDICINE

## 2024-03-07 PROCEDURE — G8419 CALC BMI OUT NRM PARAM NOF/U: HCPCS | Performed by: INTERNAL MEDICINE

## 2024-03-07 NOTE — PATIENT INSTRUCTIONS
2 week event monitor  Tilt table test  Continue current medications  Follow up in 4 weeks    Holter monitor for 2 weeks.   Any signs of irritation or the monitor falls off give us a call so we can assist you.   After the two weeks take the monitor off place it back into the box, put your address on the return label. Place directly into your mail box or [facility].     Any symptoms within the heart there is the button you can press and document in the booklet.   Please give us a call if you may have any questions. [phone].

## 2024-03-07 NOTE — PROGRESS NOTES
3/7/2024    Lindsey Montoya PA  [address]  [facility], [place] [address]    RE: Arcelia Haddad   : 2000     Dear Lindsey Montoya PA :    As you are well aware, Ms. Haddad is a pleasant 23 y.o.  female with history of tobacco abuse and recent pregnancy who was kindly referred to me for evaluation of chest pain.  Currently, the patient reports a left sided chest pain described as a \"sharp/stabbing\"sensation over the last month. Episodes can happen almost every day. The pain can last between 2 seconds and 2 minutes. The discomfort radiates down the left lateral chest wall. The pain causes shortness of breath due to breathing shallow. There is no associated nausea, vomiting, paresthesias or diaphoresis. No significant palpitations are reported. No near syncope or syncope. She reports dyspnea with more physical degrees of activity.  No prior history of heart disease.    3/21/23: Patient here for follow-up of recent echocardiogram results.  She continues to have episodes of atypical chest pain but they are overall less frequent and improved.  She is still concerned about family history of amyloidosis and had recent blood work drawn at your office for further evaluation.    3/7/24: Patient here with concerns about possible pot syndrome.  She was diagnosed with hereditary transthyretin amyloidosis and was following at the [facility] comprehensive amyloidosis clinic seen by a cardiologist there.  She has a family history of transthyretin amyloidosis a known variant TTR gene [id number] that causes neuropathy and cardiomyopathy.  Her evaluation did not reveal any definitive evidence of active amyloidosis.  Currently, she continues to have nonspecific symptoms which she is worried is related to amyloidosis.  She is also worried about the potential for POTS syndrome and is requesting a tilt table test.  She has noticed elevations in her heart rate with position changes from 70 to 120 bpm on

## 2024-03-11 ENCOUNTER — CLINICAL DOCUMENTATION (OUTPATIENT)
Dept: CARDIOLOGY CLINIC | Age: 24
End: 2024-03-11

## 2024-03-13 ENCOUNTER — TELEPHONE (OUTPATIENT)
Dept: CARDIOLOGY CLINIC | Age: 24
End: 2024-03-13

## 2024-03-13 ENCOUNTER — TELEPHONE (OUTPATIENT)
Dept: CARDIOLOGY | Facility: HOSPITAL | Age: 24
End: 2024-03-13
Payer: MEDICAID

## 2024-03-13 DIAGNOSIS — R00.0 TACHYCARDIA: Primary | ICD-10-CM

## 2024-03-13 NOTE — TELEPHONE ENCOUNTER
Received a order for a Tilt Table Test using Isuprel, not NTG.  [facility] does not use Isuprel .  Left message Jyotsna GIRON who ordered test .

## 2024-03-13 NOTE — TELEPHONE ENCOUNTER
[facility] CALLED STATES THEY ARE NOT ABLE TO DO TILT TABLE TEST  BECAUSE THEY DO NOT ISUPREL . THEY ONLY USE NTG       PLEASE ADVISE

## 2024-03-15 ENCOUNTER — HOSPITAL ENCOUNTER (OUTPATIENT)
Dept: NEUROLOGY | Age: 24
Discharge: HOME | End: 2024-03-15

## 2024-03-15 DIAGNOSIS — R20.2 PARESTHESIA: ICD-10-CM

## 2024-04-16 ENCOUNTER — OFFICE VISIT (OUTPATIENT)
Dept: CARDIOLOGY CLINIC | Age: 24
End: 2024-04-16
Payer: MEDICAID

## 2024-04-16 VITALS
SYSTOLIC BLOOD PRESSURE: 120 MMHG | HEART RATE: 73 BPM | WEIGHT: 180 LBS | OXYGEN SATURATION: 97 % | DIASTOLIC BLOOD PRESSURE: 62 MMHG | BODY MASS INDEX: 28.25 KG/M2 | RESPIRATION RATE: 15 BRPM | HEIGHT: 67 IN

## 2024-04-16 DIAGNOSIS — R00.0 TACHYCARDIA: ICD-10-CM

## 2024-04-16 PROCEDURE — G8419 CALC BMI OUT NRM PARAM NOF/U: HCPCS | Performed by: INTERNAL MEDICINE

## 2024-04-16 PROCEDURE — 4004F PT TOBACCO SCREEN RCVD TLK: CPT | Performed by: INTERNAL MEDICINE

## 2024-04-16 PROCEDURE — G8427 DOCREV CUR MEDS BY ELIG CLIN: HCPCS | Performed by: INTERNAL MEDICINE

## 2024-04-16 PROCEDURE — 99214 OFFICE O/P EST MOD 30 MIN: CPT | Performed by: INTERNAL MEDICINE

## 2024-04-16 NOTE — PROGRESS NOTES
2024    Lindsey Montoya PA  [address]  [facility] [address]    RE: Arcelia Haddad   : 2000     Dear Lindsey Montoya PA :    As you are well aware, Ms. Haddad is a pleasant 24 y.o.  female with history of tobacco abuse and recent pregnancy who was kindly referred to me for evaluation of chest pain.  Currently, the patient reports a left sided chest pain described as a \"sharp/stabbing\"sensation over the last month. Episodes can happen almost every day. The pain can last between 2 seconds and 2 minutes. The discomfort radiates down the left lateral chest wall. The pain causes shortness of breath due to breathing shallow. There is no associated nausea, vomiting, paresthesias or diaphoresis. No significant palpitations are reported. No near syncope or syncope. She reports dyspnea with more physical degrees of activity.  No prior history of heart disease.    3/21/23: Patient here for follow-up of recent echocardiogram results.  She continues to have episodes of atypical chest pain but they are overall less frequent and improved.  She is still concerned about family history of amyloidosis and had recent blood work drawn at your office for further evaluation.    3/7/24: Patient here with concerns about possible POTS syndrome.  She was diagnosed with hereditary transthyretin amyloidosis and was following at the [facility] comprehensive amyloidosis clinic seen by a cardiologist there.  She has a family history of transthyretin amyloidosis a known variant TTR gene [id number] that causes neuropathy and cardiomyopathy.  Her evaluation did not reveal any definitive evidence of active amyloidosis.  Currently, she continues to have nonspecific symptoms which she is worried is related to amyloidosis.  She is also worried about the potential for POTS syndrome and is requesting a tilt table test.  She has noticed elevations in her heart rate with position changes from 70 to 120 bpm

## 2024-04-22 ENCOUNTER — HOSPITAL ENCOUNTER (OUTPATIENT)
Dept: CARDIOLOGY | Facility: HOSPITAL | Age: 24
Discharge: HOME | End: 2024-04-22
Payer: MEDICAID

## 2024-04-22 VITALS — SYSTOLIC BLOOD PRESSURE: 98 MMHG | OXYGEN SATURATION: 97 % | HEART RATE: 68 BPM | DIASTOLIC BLOOD PRESSURE: 68 MMHG

## 2024-04-22 DIAGNOSIS — R00.0 TACHYCARDIA, UNSPECIFIED: ICD-10-CM

## 2024-04-22 PROCEDURE — 93660 TILT TABLE EVALUATION: CPT | Performed by: INTERNAL MEDICINE

## 2024-04-22 PROCEDURE — 2500000001 HC RX 250 WO HCPCS SELF ADMINISTERED DRUGS (ALT 637 FOR MEDICARE OP): Performed by: INTERNAL MEDICINE

## 2024-04-22 PROCEDURE — 93660 TILT TABLE EVALUATION: CPT

## 2024-04-22 RX ORDER — NITROGLYCERIN 0.4 MG/1
TABLET SUBLINGUAL AS NEEDED
Status: DISCONTINUED | OUTPATIENT
Start: 2024-04-22 | End: 2024-04-23 | Stop reason: HOSPADM

## 2024-04-22 RX ADMIN — NITROGLYCERIN 0.3 MG: 0.4 TABLET SUBLINGUAL at 10:48

## 2024-04-22 RX ADMIN — NITROGLYCERIN 0.3 MG: 0.4 TABLET SUBLINGUAL at 11:18

## 2024-04-24 ENCOUNTER — TELEPHONE (OUTPATIENT)
Dept: CARDIOLOGY CLINIC | Age: 24
End: 2024-04-24

## 2024-04-24 NOTE — TELEPHONE ENCOUNTER
Pt went to [facility] for a tilt table test she was told to call and let us know to request the results from them and would like a call back at [phone] for the results

## 2024-05-03 DIAGNOSIS — R00.0 TACHYCARDIA: ICD-10-CM

## 2024-05-07 ENCOUNTER — OFFICE VISIT (OUTPATIENT)
Dept: CARDIOLOGY CLINIC | Age: 24
End: 2024-05-07
Payer: MEDICAID

## 2024-05-07 ENCOUNTER — OFFICE VISIT (OUTPATIENT)
Dept: INTERNAL MEDICINE | Age: 24
End: 2024-05-07
Payer: MEDICAID

## 2024-05-07 VITALS
HEART RATE: 73 BPM | BODY MASS INDEX: 29.19 KG/M2 | DIASTOLIC BLOOD PRESSURE: 76 MMHG | RESPIRATION RATE: 15 BRPM | WEIGHT: 186 LBS | OXYGEN SATURATION: 98 % | SYSTOLIC BLOOD PRESSURE: 124 MMHG | HEIGHT: 67 IN

## 2024-05-07 VITALS
HEIGHT: 67 IN | HEART RATE: 68 BPM | OXYGEN SATURATION: 99 % | DIASTOLIC BLOOD PRESSURE: 74 MMHG | TEMPERATURE: 97.2 F | WEIGHT: 186 LBS | BODY MASS INDEX: 29.19 KG/M2 | SYSTOLIC BLOOD PRESSURE: 118 MMHG

## 2024-05-07 DIAGNOSIS — R10.2 PELVIC PAIN: ICD-10-CM

## 2024-05-07 DIAGNOSIS — N89.8 VAGINAL DISCHARGE: ICD-10-CM

## 2024-05-07 DIAGNOSIS — R00.0 TACHYCARDIA: Primary | ICD-10-CM

## 2024-05-07 DIAGNOSIS — G47.09 OTHER INSOMNIA: ICD-10-CM

## 2024-05-07 DIAGNOSIS — Z12.4 SCREENING FOR CERVICAL CANCER: ICD-10-CM

## 2024-05-07 DIAGNOSIS — R10.2 PELVIC PAIN: Primary | ICD-10-CM

## 2024-05-07 DIAGNOSIS — R06.02 SOB (SHORTNESS OF BREATH): ICD-10-CM

## 2024-05-07 DIAGNOSIS — R00.2 HEART PALPITATIONS: ICD-10-CM

## 2024-05-07 DIAGNOSIS — F41.9 ANXIETY: ICD-10-CM

## 2024-05-07 LAB
BILIRUBIN, POC: ABNORMAL
BLOOD URINE, POC: ABNORMAL
CLARITY, POC: CLEAR
COLOR, POC: YELLOW
GLUCOSE URINE, POC: ABNORMAL
KETONES, POC: ABNORMAL
LEUKOCYTE EST, POC: ABNORMAL
NITRITE, POC: ABNORMAL
PH, POC: 6.5
PROTEIN, POC: ABNORMAL
SPECIFIC GRAVITY, POC: 1.02
UROBILINOGEN, POC: ABNORMAL

## 2024-05-07 PROCEDURE — 99214 OFFICE O/P EST MOD 30 MIN: CPT | Performed by: INTERNAL MEDICINE

## 2024-05-07 PROCEDURE — G8419 CALC BMI OUT NRM PARAM NOF/U: HCPCS | Performed by: PHYSICIAN ASSISTANT

## 2024-05-07 PROCEDURE — 99214 OFFICE O/P EST MOD 30 MIN: CPT | Performed by: PHYSICIAN ASSISTANT

## 2024-05-07 PROCEDURE — G8427 DOCREV CUR MEDS BY ELIG CLIN: HCPCS | Performed by: INTERNAL MEDICINE

## 2024-05-07 PROCEDURE — G8419 CALC BMI OUT NRM PARAM NOF/U: HCPCS | Performed by: INTERNAL MEDICINE

## 2024-05-07 PROCEDURE — 4004F PT TOBACCO SCREEN RCVD TLK: CPT | Performed by: INTERNAL MEDICINE

## 2024-05-07 PROCEDURE — G8427 DOCREV CUR MEDS BY ELIG CLIN: HCPCS | Performed by: PHYSICIAN ASSISTANT

## 2024-05-07 PROCEDURE — 81003 URINALYSIS AUTO W/O SCOPE: CPT | Performed by: PHYSICIAN ASSISTANT

## 2024-05-07 PROCEDURE — 4004F PT TOBACCO SCREEN RCVD TLK: CPT | Performed by: PHYSICIAN ASSISTANT

## 2024-05-07 RX ORDER — GABAPENTIN 100 MG/1
100 CAPSULE ORAL NIGHTLY
Qty: 30 CAPSULE | Refills: 0 | Status: SHIPPED | OUTPATIENT
Start: 2024-05-07 | End: 2024-06-06

## 2024-05-07 NOTE — PROGRESS NOTES
Arcelia Haddad (: 2000) is a 24 y.o. female, Established patient, here for evaluation of the following chief complaint(s):  Pelvic Pain (2 years ago went to urologist. interstitial cystitis was a possibility.  /Was told she may have pelvic congestion syndrome.  )        ASSESSMENT/PLAN:  1. Pelvic pain  2. Vaginal discharge  3. Screening for cervical cancer  - PAP SMEAR; Future  - Culture, Genital; Future  - Culture, Urine; Future  - [facility] Remy Colindres DO, OB-GYN, [place]  - Albert Do MD, Urology, [place]  - POCT Urinalysis No Micro (Auto    4. Heart palpitations  5. Other insomnia  6. Anxiety  7. SOB (shortness of breath)  - already seen cardiology and rheumatology, neg for heart issues, POTS, and amyloidosis   - advised patient that it is likely due to stress.anxiety   - she declines treatment         No follow-ups on file.    SUBJECTIVE/OBJECTIVE:  HPI      Left lower flanks pain and low back  Comes and goes  Worse with urination   Was diagnosed with interstitial cystitis in the past   No vaginal discharge  Wants PAP done today    Still having tingling of the hands, falling asleep, joint pain , SOB, racing heart   Neg for POTS and autoimmune disorders   All thinks that it may be stress , but she doesn't feel stressed    Neg for amyloidosis       Review of Systems   Constitutional: Negative.    HENT: Negative.     Respiratory:  Positive for shortness of breath. Negative for cough, chest tightness and wheezing.    Cardiovascular:  Positive for palpitations.   Genitourinary:  Positive for flank pain and pelvic pain. Negative for dysuria, hematuria, urgency, vaginal bleeding, vaginal discharge and vaginal pain.   Musculoskeletal:  Positive for arthralgias and back pain.   Neurological:  Positive for numbness. Seizures: and tingling.  Psychiatric/Behavioral:  Positive for sleep disturbance. The patient is nervous/anxious.        Physical Exam  Vitals reviewed.

## 2024-05-07 NOTE — PROGRESS NOTES
on her smart watch.  She denies any associated near-syncope or syncope.  She does have a intermittent palpitations described as a fluttering or pounding sensation.  No overt anginal chest pain or worsening exertional dyspnea.    4/16/2024: Patient here for follow-up with possible POTS syndrome and recent cardiac test results.  She completed her event monitor however is not scheduled for tilt table test until later this month.  Currently, she reports feeling \"about the same\".  She continues to have intermittent elevations in her heart rate but no near-syncope or syncope.  No chest pain or shortness of breath reported.  Her recent 2-week event monitor was negative for any pathologic arrhythmias.    5/7/24: Patient here for follow-up with possible POTS syndrome and recent tilt table test results.  Currently, she denies any new issues with near syncope or syncope.  No dizziness.  She continues to have intermittent elevations of heart rate but no palpitations.  Recent tilt table test 4/22/2024 showed vasodepressive response with drop in blood pressure to 53/43 with heart rate 81 bpm associated with lightheadedness fatigue and nausea after sublingual nitroglycerin.  No syncope during the test.      Current medications:   Current Outpatient Medications   Medication Sig Dispense Refill    PREVIDENT 5000 BOOSTER PLUS 1.1 % PSTE BRUSH TWICE DAILY FOR TWO MINUTES. EXPECTORATE. DO NOT SWALLOW. DO NOT RINSE AFTER BRUSHING. (Patient not taking: Reported on 2/12/2024)       No current facility-administered medications for this visit.       Allergies: Iodine     Review of Systems   General: Feels \"about the same\"  Cardiovascular: See HPI. No orthopnea or PND.   Respiratory: Dyspnea is present with mild to moderate degrees of activity, improved.  Gastrointestinal: No melena or hematochezia.   Genitourinary: No hematuria.   Hematological: No easy bruising or bleeding.   Vascular: No lower extremity edema or

## 2024-05-09 ENCOUNTER — TELEPHONE (OUTPATIENT)
Dept: FAMILY MEDICINE CLINIC | Age: 24
End: 2024-05-09

## 2024-05-09 LAB — BACTERIA UR CULT: NORMAL

## 2024-05-11 LAB — BACTERIA GENITAL AEROBE CULT: NORMAL

## 2024-05-14 LAB
HPV HR 12 DNA SPEC QL NAA+PROBE: NOT DETECTED
HPV16 DNA SPEC QL NAA+PROBE: NOT DETECTED
HPV16+18+H RISK 12 DNA SPEC-IMP: NORMAL
HPV18 DNA SPEC QL NAA+PROBE: NOT DETECTED

## 2024-05-16 ASSESSMENT — ENCOUNTER SYMPTOMS
WHEEZING: 0
SHORTNESS OF BREATH: 1
BACK PAIN: 1
CHEST TIGHTNESS: 0
COUGH: 0

## 2024-06-12 PROBLEM — Z15.89 MONOALLELIC MUTATION OF TTR GENE: Status: ACTIVE | Noted: 2024-01-29

## 2024-06-12 PROBLEM — N94.89 PELVIC CONGESTION SYNDROME: Status: ACTIVE | Noted: 2023-02-06

## 2024-06-12 RX ORDER — FLUCONAZOLE 150 MG/1
TABLET ORAL
COMMUNITY
Start: 2023-09-19

## 2024-06-12 RX ORDER — FLUORIDE (SODIUM) 1.1 %
PASTE (ML) DENTAL
COMMUNITY
Start: 2024-01-31

## 2024-06-13 ENCOUNTER — PATIENT MESSAGE (OUTPATIENT)
Dept: INTERNAL MEDICINE | Age: 24
End: 2024-06-13

## 2024-06-14 ENCOUNTER — OFFICE VISIT (OUTPATIENT)
Dept: INTERNAL MEDICINE | Age: 24
End: 2024-06-14
Payer: MEDICAID

## 2024-06-14 ENCOUNTER — OFFICE VISIT (OUTPATIENT)
Dept: OBGYN CLINIC | Age: 24
End: 2024-06-14
Payer: MEDICAID

## 2024-06-14 VITALS
TEMPERATURE: 97.2 F | HEIGHT: 67 IN | HEART RATE: 65 BPM | WEIGHT: 188 LBS | DIASTOLIC BLOOD PRESSURE: 66 MMHG | BODY MASS INDEX: 29.51 KG/M2 | OXYGEN SATURATION: 99 % | SYSTOLIC BLOOD PRESSURE: 116 MMHG

## 2024-06-14 VITALS
WEIGHT: 188 LBS | DIASTOLIC BLOOD PRESSURE: 72 MMHG | HEIGHT: 67 IN | SYSTOLIC BLOOD PRESSURE: 108 MMHG | BODY MASS INDEX: 29.51 KG/M2

## 2024-06-14 DIAGNOSIS — R10.2 PELVIC PAIN: Primary | ICD-10-CM

## 2024-06-14 DIAGNOSIS — M25.50 POLYARTHRALGIA: Primary | ICD-10-CM

## 2024-06-14 PROCEDURE — 99213 OFFICE O/P EST LOW 20 MIN: CPT | Performed by: PHYSICIAN ASSISTANT

## 2024-06-14 PROCEDURE — 4004F PT TOBACCO SCREEN RCVD TLK: CPT | Performed by: OBSTETRICS & GYNECOLOGY

## 2024-06-14 PROCEDURE — G8427 DOCREV CUR MEDS BY ELIG CLIN: HCPCS | Performed by: OBSTETRICS & GYNECOLOGY

## 2024-06-14 PROCEDURE — 99203 OFFICE O/P NEW LOW 30 MIN: CPT | Performed by: OBSTETRICS & GYNECOLOGY

## 2024-06-14 PROCEDURE — 4004F PT TOBACCO SCREEN RCVD TLK: CPT | Performed by: PHYSICIAN ASSISTANT

## 2024-06-14 PROCEDURE — G8419 CALC BMI OUT NRM PARAM NOF/U: HCPCS | Performed by: PHYSICIAN ASSISTANT

## 2024-06-14 PROCEDURE — G8427 DOCREV CUR MEDS BY ELIG CLIN: HCPCS | Performed by: PHYSICIAN ASSISTANT

## 2024-06-14 PROCEDURE — G8419 CALC BMI OUT NRM PARAM NOF/U: HCPCS | Performed by: OBSTETRICS & GYNECOLOGY

## 2024-06-14 RX ORDER — GABAPENTIN 100 MG/1
100 CAPSULE ORAL 2 TIMES DAILY
Qty: 180 CAPSULE | Refills: 1 | Status: SHIPPED | OUTPATIENT
Start: 2024-06-14 | End: 2024-12-11

## 2024-06-14 RX ORDER — IBUPROFEN 800 MG/1
800 TABLET ORAL 3 TIMES DAILY PRN
Qty: 90 TABLET | Refills: 1 | Status: SHIPPED | OUTPATIENT
Start: 2024-06-14

## 2024-06-14 NOTE — PROGRESS NOTES
Arcelia Haddad (: 2000) is a 24 y.o. female, Established patient, here for evaluation of the following chief complaint(s):  Medication Check (PT is here for a follow up from the gabapentin.  Medication has helped with the pain.  Medication will need to be increased.  )        ASSESSMENT/PLAN:  1. Polyarthralgia  - increased the fose for better control of the pain   - gabapentin (NEURONTIN) 100 MG capsule; Take 1 capsule by mouth 2 times daily for 180 days.  Dispense: 180 capsule; Refill: 1        No follow-ups on file.    SUBJECTIVE/OBJECTIVE:  HPI  1 month follow-up after starting gabapentin for pain  She was started on 100 mg nightly  States this is helping somewhat  Would like an increase in the dose      Review of Systems   All other systems reviewed and are negative.      Physical Exam  Vitals reviewed.   Constitutional:       Appearance: Normal appearance.   HENT:      Head: Normocephalic.   Cardiovascular:      Rate and Rhythm: Normal rate.      Heart sounds: Normal heart sounds.   Pulmonary:      Effort: Pulmonary effort is normal.      Breath sounds: Normal breath sounds.   Neurological:      Mental Status: She is alert.   Psychiatric:         Mood and Affect: Mood normal.         Behavior: Behavior normal.         Vitals:    24 1315   BP: 116/66   Site: Right Upper Arm   Position: Sitting   Cuff Size: Medium Adult   Pulse: 65   Temp: 97.2 °F (36.2 °C)   SpO2: 99%   Weight: 85.3 kg (188 lb)   Height: 1.702 m (5' 7\")                 An electronic signature was used to authenticate this note.    --HELEN Parks

## 2024-06-18 ENCOUNTER — HOSPITAL ENCOUNTER (OUTPATIENT)
Dept: ULTRASOUND IMAGING | Age: 24
Discharge: HOME | End: 2024-06-20
Attending: OBSTETRICS & GYNECOLOGY
Payer: MEDICAID

## 2024-06-18 DIAGNOSIS — R10.2 PELVIC PAIN: ICD-10-CM

## 2024-06-18 PROCEDURE — 76856 US EXAM PELVIC COMPLETE: CPT

## 2024-06-18 PROCEDURE — 93975 VASCULAR STUDY: CPT

## 2024-06-18 PROCEDURE — 76830 TRANSVAGINAL US NON-OB: CPT

## 2024-06-21 ASSESSMENT — ENCOUNTER SYMPTOMS
ABDOMINAL PAIN: 1
SHORTNESS OF BREATH: 0
APNEA: 0

## 2024-06-21 NOTE — PROGRESS NOTES
Subjective:      Patient ID:  Arcelia Haddad is a 24 y.o. female with chief complaint of:  Chief Complaint   Patient presents with    New Patient     Pt here to discuss pelvic congestion that was suspected on Ct scan from 2021        Patient presents today with some concerns related to pelvic congestion patient had a CAT scan done in 23 and she is bothered by just some persistent abdominal discomfort and wanted some insight on things that can be done.  Patient cycles are regular she has had previous deliveries without difficulty.        Past Medical History:   Diagnosis Date    Abnormal ECG     Depression     IBS (irritable bowel syndrome)     Pelvic congestive syndrome     Urinary incontinence      No past surgical history on file.  Family History   Problem Relation Age of Onset    Alcohol Abuse Mother     Arthritis Mother     Cancer Father     Alcohol Abuse Father      Current Outpatient Medications on File Prior to Visit   Medication Sig Dispense Refill    PREVIDENT 5000 BOOSTER PLUS 1.1 % PSTE BRUSH TWICE DAILY FOR TWO MINUTES. EXPECTORATE. DO NOT SWALLOW. DO NOT RINSE AFTER BRUSHING. (Patient not taking: Reported on 2/12/2024)       No current facility-administered medications on file prior to visit.     Allergies:  Iodine    Review of Systems   Constitutional:  Negative for fatigue and fever.   Respiratory:  Negative for apnea and shortness of breath.    Cardiovascular:  Negative for chest pain and palpitations.   Gastrointestinal:  Positive for abdominal pain.   Genitourinary:  Positive for dyspareunia and pelvic pain. Negative for difficulty urinating, dysuria, vaginal bleeding and vaginal discharge.   Neurological:  Negative for dizziness, weakness and light-headedness.   Psychiatric/Behavioral:  Negative for agitation and dysphoric mood.        Objective:   /72   Ht 1.702 m (5' 7\")   Wt 85.3 kg (188 lb)   LMP 05/15/2024   BMI 29.44 kg/m²      Physical Exam  Constitutional:       Appearance:

## 2024-07-01 ENCOUNTER — OFFICE VISIT (OUTPATIENT)
Dept: UROLOGY | Facility: CLINIC | Age: 24
End: 2024-07-01
Payer: MEDICAID

## 2024-07-01 VITALS — SYSTOLIC BLOOD PRESSURE: 113 MMHG | WEIGHT: 185.1 LBS | HEART RATE: 72 BPM | DIASTOLIC BLOOD PRESSURE: 62 MMHG

## 2024-07-01 DIAGNOSIS — N94.810 VULVAR VESTIBULITIS: ICD-10-CM

## 2024-07-01 DIAGNOSIS — R39.9 UTI SYMPTOMS: ICD-10-CM

## 2024-07-01 DIAGNOSIS — R10.2 PELVIC PAIN: ICD-10-CM

## 2024-07-01 DIAGNOSIS — N81.89 PELVIC FLOOR WEAKNESS: ICD-10-CM

## 2024-07-01 DIAGNOSIS — F17.200 SMOKER: Primary | ICD-10-CM

## 2024-07-01 DIAGNOSIS — R31.9 HEMATURIA, UNSPECIFIED TYPE: ICD-10-CM

## 2024-07-01 LAB
POC APPEARANCE, URINE: CLEAR
POC BILIRUBIN, URINE: NEGATIVE
POC BLOOD, URINE: ABNORMAL
POC COLOR, URINE: YELLOW
POC GLUCOSE, URINE: NEGATIVE MG/DL
POC KETONES, URINE: NEGATIVE MG/DL
POC LEUKOCYTES, URINE: ABNORMAL
POC NITRITE,URINE: NEGATIVE
POC PH, URINE: 7 PH
POC PROTEIN, URINE: NEGATIVE MG/DL
POC SPECIFIC GRAVITY, URINE: 1.01
POC UROBILINOGEN, URINE: 0.2 EU/DL

## 2024-07-01 PROCEDURE — 99204 OFFICE O/P NEW MOD 45 MIN: CPT | Performed by: OBSTETRICS & GYNECOLOGY

## 2024-07-01 PROCEDURE — 99214 OFFICE O/P EST MOD 30 MIN: CPT | Performed by: OBSTETRICS & GYNECOLOGY

## 2024-07-01 PROCEDURE — 87086 URINE CULTURE/COLONY COUNT: CPT | Performed by: OBSTETRICS & GYNECOLOGY

## 2024-07-01 PROCEDURE — 81003 URINALYSIS AUTO W/O SCOPE: CPT | Performed by: OBSTETRICS & GYNECOLOGY

## 2024-07-01 RX ORDER — GABAPENTIN 100 MG/1
200 CAPSULE ORAL DAILY
COMMUNITY

## 2024-07-01 NOTE — PROGRESS NOTES
Subjective   Patient ID: Gena Cuenca is a 24 y.o. female who presents for No chief complaint on file..  HPI  24 y.o.  patient presenting as a referral from Dr. Golden Harper  with complaints of Interstitial cystitis, pelvic pain, dysuria and burning micturition.    The patient presents with concerns regarding IC, she reports that 2 years ago while she was pregnant she had recurrent UTI like symptoms but her cultures came back negative and was diagnosed to to have IC. More recently she has started noted worsening recurrence of her symptoms. She now notes pelvic pain, burning, dysuria, rare episodic incontinence and pressure with UTI like symptoms. She notes pain when she tried to hold herself from voiding and notes worsening dysuria and burning when she voids. She is a known case of pelvic congestion syndrome and states her symptoms are different from what she experiences with the syndrome. She was seen by multiple physicians for pelvic pain and has tried PFPT once but this is not convenient for her.     She denies any urinary urgency and frequency complaints. She denies any episodes of nocturia or enuresis. She denies any leaking with laughing, coughing and sneezing. She denies any History of nephrolithiasis, gross hematuria or chronic recurrent UTIs. She is a smoker, smokes a pack a day. She has normal menstrual cycle.     Previous imaging was reviewed. TVS from 619/2024 demonstrated Mildly enlarged and somewhat heterogeneous uterus.  No uterine mass.   Normal appearance of the endometrium.  Normal appearance of the bilateral ovaries.  There are no adnexal masses.  Normal Doppler flow within the ovaries. Mildly prominent bilateral adnexal vascularity. CT imaging from 2021 showed No abscess free  air or definite obstruction. Prominent vessels of the pelvis suspicious for pelvic congestion.     She is sexually active and notes pain on deep penetration. She denies any vaginal complaints, no abnormal vaginal  bleeding or discharge. She denies any vaginal dryness or irritation. She denies any bulge complaints, no pressure or pulling.    She denies any bowel related complaints, no fecal or flatal incontinence.    She has no other complaints.      Review of Systems  Constitutional: No fever, No chills and No fatigue.   Eyes: No vision problems and No dryness of the eyes.   ENT: No dry mouth, No hearing loss and No nosebleeds.   Cardiovascular: No chest pain, No palpitations and No orthopnea.   Respiratory: No shortness of breath, No cough and No wheezing.   Gastrointestinal: No abdominal pain, No constipation, No nausea, No diarrhea, No vomiting and No melena.   Genitourinary: As noted in HPI.   Musculoskeletal: No back pain, No myalgias, No muscle weakness, No joint swelling and No leg edema.   Integumentary: No rashes, No skin lesion and No itching.   Neurological: No headache, No numbness and No dizziness.   Psychiatric: No sleep disturbances, No anxiety and No depression.   Endocrine: No hot flashes, No loss of hair and No hirsutism.   Hematologic/Lymphatic: No swollen glands, No tendency for easy bleeding and No tendency for easy bruising.   All other systems have been reviewed and are negative for complaint.        Objective   Physical Exam  PHYSICAL EXAMINATION:  No LMP recorded.  There is no height or weight on file to calculate BMI.  There were no vitals taken for this visit.  General Appearance: well appearing  Neuro: Alert and oriented   HEENT: mucous membranes moist, neck supple  Resp: No respiratory distress, normal work of breathing  MSK: normal range of motion, gait appropriate    Pelvic:  Genitourinary:  normal external genitalia, Bartholin's glands negative, Venedocia's glands negative.  However there appears to be significant erythema at [place] line with erythema noted in the bilateral periurethral crypts with significant tenderness to bilateral palpation concerning for vulvar vestibulitis  Urethra   normal  meatus, non-tender, no periurethral mass  Vaginal mucosa  normal  Cervix  normal  Uterus normal size, non-tender, mobile  Adnexae  negative nontender, no masses  Atrophy negative    CST negative  Pelvic floor muscle contraction  1/5, 3 out of 10 pain throughout the right pelvic floor and 5-6 out of 10 pain on the left with pain in the bilateral obturators    POP-Q (in supine position):  Stage 0    Rectal: no hemorrhoids, fissures or masses    Assessment/Plan   24-year-old presenting with urinary urgency, bladder pain, pelvic pain and weakness, concerns for pelvic congestion syndrome, and concerns for vulvar vestibulitis.    1.  We discussed the patient's lower urinary tract symptoms.  We discussed that this may be directly related to her pelvic pain and weakness noted on exam today.  She was also noted to have significant discomfort over the bilateral periurethral crypts concerning for vulvar vestibulitis.  We discussed proceeding with pelvic floor physical therapy and she was provided a referral as well as a list of providers.  Proceeding with a standing order for urinalysis and urine culture should she have any UTI-like symptoms.  We discussed following up with my colleague Dr. Summers for evaluation for her vulvar vestibulitis concerns and she will be contacted for scheduling at her earliest convenience.    #2 we will readdress her lower urinary tract and pelvic complaints after pelvic floor physical therapy and evaluation by Dr. Summers.    CARLOS Velázquez MD    Scribe Attestation  By signing my name below, I, Jefferson Garcia attest that this documentation has been prepared under the direction and in the presence of Azar Velázquez MD. All medical record entries made by the Scribe were at my direction or personally dictated by me. I have reviewed the chart and agree that the record accurately reflects my personal performance of the history, physical exam, discussion and plan.

## 2024-07-01 NOTE — PATIENT INSTRUCTIONS
Please follow-up with Dr. Summers for concerns for vulvar vestibulitis.    You have been provided a standing order for urinalysis and urine culture.  Should you have any UTI symptoms, please present immediately to any [facility] lab.    You have been provided a referral for pelvic floor physical therapy.  You have been provided a list of providers.  Please follow-up at your earliest convenience.    Please contact the clinic with any questions or concerns.    [phone]

## 2024-07-02 LAB — BACTERIA UR CULT: NORMAL

## 2024-10-09 NOTE — PROGRESS NOTES
INITIAL EVALUATION       HISTORY OF PRESENT ILLNESS:   Gena Cuenca is a 24 y.o. female who presents today for evaluation of vestibulitis, referred to me by Azar Velázquez MD. Burning pain to vulva when touched since second vaginal delivery in 2022. No hx of major pelvic infections. Has not yet tried anything for the pain; does not feel that she would be able to do PFPT since she cannot do schedule of weekly appts. She is sexually active and notes pain on deep penetration as well as insertion related pain. +vaginal dryness both daily and during sexual activity. She denies any vaginal complaints, no abnormal vaginal bleeding or discharge. No hx of sexual trauma or abuse, feels good about current relationship.+dysuria, does note was diagnosed with interstitial cystitis and has standing order for urine cx when she has symptoms however reports she is not currently receiving tx for it and is uncertain whether she plans to see Dr. Velázquez again and is not currently scheduled with follow up with him prior to his sabbatical. She is a smoker, smokes a pack a day. She has normal menstrual cycle. Has not noticed any changed with her pain surrounding menstruation and no dyschezia     had vasectomy for contraception    PAST MEDICAL HISTORY:  Past Medical History:   Diagnosis Date    Interstitial cystitis        PAST SURGICAL HISTORY:  No past surgical history on file.     ALLERGIES:   Allergies   Allergen Reactions    Iodine Hives and Itching     Facial hives, pruritus     Facial hives, pruritus       Facial hives, pruritus    Facial hives, pruritus        MEDICATIONS:   Medication Documentation Review Audit       Reviewed by Jacqui Gold MA (Medical Assistant) on 10/10/24 at 0855      Medication Order Taking? Sig Documenting Provider Last Dose Status   gabapentin (Neurontin) 100 mg capsule [id number]  Take 2 capsules (200 mg) by mouth once daily. Historical Provider, MD  Active                     SOCIAL  HISTORY:  Patient  reports that she has been smoking cigarettes. She has never been exposed to tobacco smoke. She has never used smokeless tobacco. She reports that she does not currently use alcohol. She reports that she does not currently use drugs.   Social History     Socioeconomic History    Marital status: Unknown     Spouse name: Not on file    Number of children: Not on file    Years of education: Not on file    Highest education level: Not on file   Occupational History    Not on file   Tobacco Use    Smoking status: Every Day     Types: Cigarettes     Passive exposure: Never    Smokeless tobacco: Never   Substance and Sexual Activity    Alcohol use: Not Currently    Drug use: Not Currently    Sexual activity: Not on file   Other Topics Concern    Not on file   Social History Narrative    Not on file     Social Determinants of Health     Financial Resource Strain: Low Risk  (2/12/2024)    Received from [facility]    Overall Financial Resource Strain (CARDIA)     Difficulty of Paying Living Expenses: Not hard at all   Food Insecurity: No Food Insecurity (2/12/2024)    [facility]    Hunger Vital Sign     Worried About Running Out of Food in the Last Year: Never true     Ran Out of Food in the Last Year: Never true   Transportation Needs: Unknown (2/12/2024)    Received from [facility]    PRAPARE - Transportation     Lack of Transportation (Medical): Not on file     Lack of Transportation (Non-Medical): No   Physical Activity: Inactive (2/3/2023)    Received from [facility]    Exercise Vital Sign     Days of Exercise per Week: 0 days     Minutes of Exercise per Session: 0 min   Stress: Not on file   Social Connections: Moderately Isolated (8/24/2023)    Received from  "[facility], [facility]    Social Connection and Isolation Panel [NHANES]     Frequency of Communication with Friends and Family: More than three times a week     Frequency of Social Gatherings with Friends and Family: Not on file     Attends [employer] Services: Never     Active Member of Clubs or Organizations: No     Attends Club or Organization Meetings: Not on file     Marital Status: Living with partner   Intimate Partner Violence: Not At Risk (2/3/2023)    Received from [facility]    Humiliation, Afraid, Rape, and Kick questionnaire     Fear of Current or Ex-Partner: No     Emotionally Abused: No     Physically Abused: No     Sexually Abused: No   Housing Stability: Unknown (2/12/2024)    Received from [facility]    Housing Stability Vital Sign     Unable to Pay for Housing in the Last Year: Not on file     Number of Places Lived in the Last Year: Not on file     Unstable Housing in the Last Year: No       FAMILY HISTORY:  No family history on file.    REVIEW OF SYSTEMS:  Constitutional: Negative for fever and chills. Denies anorexia, weight loss.  Eyes: Negative for visual disturbance.   Respiratory: Negative for shortness of breath.    Cardiovascular: Negative for chest pain.   Gastrointestinal: Negative for nausea and vomiting.   Genitourinary: See interval history above.  Skin: Negative for rash.   Neurological: Negative for dizziness and numbness.   Psychiatric/Behavioral: Negative for confusion and decreased concentration.     PHYSICAL EXAM:  Blood pressure 126/84, pulse 68, temperature 36.5 °C (97.7 °F), height 1.727 m (5' 8\"), weight 82.6 kg (182 lb 3.2 oz).  Constitutional: Patient appears well-developed and well-nourished. No distress.    Head: Normocephalic and atraumatic.    Neck: Normal range of motion.    Cardiovascular: Normal rate.    Pulmonary/Chest: Effort normal. No " respiratory distress.   : no varicosities noted to vulva, no lesions or tenderness to labia, vestibule tender at 6 o'clock/2 o'clock/10 o'clock, vagina diffusely tender but most prominent over anterior vaginal wall and L pelvic floor musculature, uterus normal size/shape/contour, no nodularity in posterior fornix  Musculoskeletal: Normal range of motion.    Neurological: Alert and oriented to person, place, and time.  Psychiatric: Normal mood and affect. Behavior is normal. Thought content normal.      PATHOLOGY REVIEW:  Pap wnl 2024    RADIOLOGY REVIEW:  Imaging notes suspected pelvic congestion syndrome       Assessment:      1. Vulvodynia        2. Dysuria  Urinalysis with Reflex Microscopic    Urine culture      3. 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 Does note it started after 2nd vaginal delivery although reports no major infections or issues that time. Next would try dilators and Singulair.   Interstitial cystitis:  Discussed dietary changes and smoking cessation; pt reports it will be difficult to make major diet changes. Will trial hydroxyzine and plan for diagnostic cystoscopy in office per pt request. Future options of bladder instillations, bladder botox. Has standing UA/Ucx for when she is symptomatic  Scheduled to see vascular surgeon Dr. Ornelas for pelvic congestion syndrome which has been noted multiple prior times on imaging although pt does not notice worsening of sx with prolonged standing  Pap wnl 2024, due 2027    Follow up for cystoscopy then follow up in office in 3 months    Ceciila Brooke MD  Female  Reconstruction & Sexual Medicine Fellow  Dept of Urology/OBGYN  10/10/2024

## 2024-10-10 ENCOUNTER — APPOINTMENT (OUTPATIENT)
Dept: UROLOGY | Facility: CLINIC | Age: 24
End: 2024-10-10
Payer: MEDICAID

## 2024-10-10 VITALS
TEMPERATURE: 97.7 F | WEIGHT: 182.2 LBS | BODY MASS INDEX: 27.61 KG/M2 | SYSTOLIC BLOOD PRESSURE: 126 MMHG | DIASTOLIC BLOOD PRESSURE: 84 MMHG | HEIGHT: 68 IN | HEART RATE: 68 BPM

## 2024-10-10 DIAGNOSIS — N30.10 INTERSTITIAL CYSTITIS: ICD-10-CM

## 2024-10-10 DIAGNOSIS — N94.819 VULVODYNIA: Primary | ICD-10-CM

## 2024-10-10 DIAGNOSIS — R30.0 DYSURIA: ICD-10-CM

## 2024-10-10 PROCEDURE — 99204 OFFICE O/P NEW MOD 45 MIN: CPT | Performed by: OBSTETRICS & GYNECOLOGY

## 2024-10-10 PROCEDURE — 3008F BODY MASS INDEX DOCD: CPT | Performed by: OBSTETRICS & GYNECOLOGY

## 2024-10-10 RX ORDER — HYDROXYZINE PAMOATE 25 MG/1
25 CAPSULE ORAL NIGHTLY
Qty: 30 CAPSULE | Refills: 11 | Status: SHIPPED | OUTPATIENT
Start: 2024-10-10

## 2024-10-10 ASSESSMENT — PAIN SCALES - GENERAL: PAINLEVEL: 0-NO PAIN

## 2024-10-10 NOTE — PATIENT INSTRUCTIONS
IChelp.org has recommendations to help with interstitial cystitis   Silicone based lubricants such as Uberlube for sexual activity  Vaginal moisturizers are also an option to help with daily dryness symptoms  
none

## 2024-10-15 ENCOUNTER — PREP FOR PROCEDURE (OUTPATIENT)
Dept: UROLOGY | Facility: CLINIC | Age: 24
End: 2024-10-15
Payer: MEDICAID

## 2024-10-15 DIAGNOSIS — N30.10 INTERSTITIAL CYSTITIS: ICD-10-CM

## 2024-10-22 ENCOUNTER — LAB (OUTPATIENT)
Dept: LAB | Facility: LAB | Age: 24
End: 2024-10-22
Payer: MEDICAID

## 2024-10-22 DIAGNOSIS — N81.89 PELVIC FLOOR WEAKNESS: ICD-10-CM

## 2024-10-22 DIAGNOSIS — R39.9 UTI SYMPTOMS: ICD-10-CM

## 2024-10-22 DIAGNOSIS — R10.2 PELVIC PAIN: ICD-10-CM

## 2024-10-22 DIAGNOSIS — N94.810 VULVAR VESTIBULITIS: ICD-10-CM

## 2024-10-22 DIAGNOSIS — R30.0 DYSURIA: ICD-10-CM

## 2024-10-22 LAB
APPEARANCE UR: CLEAR
BACTERIA #/AREA URNS AUTO: ABNORMAL /HPF
BILIRUB UR STRIP.AUTO-MCNC: NEGATIVE MG/DL
COLOR UR: ABNORMAL
GLUCOSE UR STRIP.AUTO-MCNC: NORMAL MG/DL
KETONES UR STRIP.AUTO-MCNC: NEGATIVE MG/DL
LEUKOCYTE ESTERASE UR QL STRIP.AUTO: ABNORMAL
NITRITE UR QL STRIP.AUTO: NEGATIVE
PH UR STRIP.AUTO: 6.5 [PH]
PROT UR STRIP.AUTO-MCNC: NEGATIVE MG/DL
RBC # UR STRIP.AUTO: ABNORMAL /UL
RBC #/AREA URNS AUTO: ABNORMAL /HPF
SP GR UR STRIP.AUTO: 1.01
SQUAMOUS #/AREA URNS AUTO: ABNORMAL /HPF
UROBILINOGEN UR STRIP.AUTO-MCNC: NORMAL MG/DL
WBC #/AREA URNS AUTO: >50 /HPF

## 2024-10-22 PROCEDURE — 81001 URINALYSIS AUTO W/SCOPE: CPT

## 2024-10-22 PROCEDURE — 87086 URINE CULTURE/COLONY COUNT: CPT

## 2024-10-23 LAB
BACTERIA UR CULT: NORMAL
HOLD SPECIMEN: NORMAL

## 2024-11-13 DIAGNOSIS — Z01.818 PRE-OP TESTING: Primary | ICD-10-CM

## 2024-11-14 ENCOUNTER — LAB (OUTPATIENT)
Dept: LAB | Facility: LAB | Age: 24
End: 2024-11-14
Payer: MEDICAID

## 2024-11-14 DIAGNOSIS — Z01.818 PRE-OP TESTING: ICD-10-CM

## 2024-11-14 PROCEDURE — 85014 HEMATOCRIT: CPT

## 2024-11-14 PROCEDURE — 80048 BASIC METABOLIC PNL TOTAL CA: CPT

## 2024-11-14 PROCEDURE — 36415 COLL VENOUS BLD VENIPUNCTURE: CPT

## 2024-11-14 PROCEDURE — 85018 HEMOGLOBIN: CPT

## 2024-11-15 LAB
ANION GAP SERPL CALC-SCNC: 14 MMOL/L (ref 10–20)
BUN SERPL-MCNC: 12 MG/DL (ref 6–23)
CALCIUM SERPL-MCNC: 9.8 MG/DL (ref 8.6–10.6)
CHLORIDE SERPL-SCNC: 104 MMOL/L (ref 98–107)
CO2 SERPL-SCNC: 26 MMOL/L (ref 21–32)
CREAT SERPL-MCNC: 0.92 MG/DL (ref 0.5–1.05)
EGFRCR SERPLBLD CKD-EPI 2021: 90 ML/MIN/1.73M*2
GLUCOSE SERPL-MCNC: 75 MG/DL (ref 74–99)
HCT VFR BLD AUTO: 43.1 % (ref 36–46)
HGB BLD-MCNC: 14.2 G/DL (ref 12–16)
POTASSIUM SERPL-SCNC: 4.2 MMOL/L (ref 3.5–5.3)
SODIUM SERPL-SCNC: 140 MMOL/L (ref 136–145)

## 2024-11-20 ENCOUNTER — ANESTHESIA (OUTPATIENT)
Dept: OPERATING ROOM | Facility: HOSPITAL | Age: 24
End: 2024-11-20
Payer: MEDICAID

## 2024-11-20 ENCOUNTER — HOSPITAL ENCOUNTER (OUTPATIENT)
Facility: HOSPITAL | Age: 24
Setting detail: OUTPATIENT SURGERY
Discharge: HOME | End: 2024-11-20
Attending: OBSTETRICS & GYNECOLOGY | Admitting: OBSTETRICS & GYNECOLOGY
Payer: MEDICAID

## 2024-11-20 ENCOUNTER — ANESTHESIA EVENT (OUTPATIENT)
Dept: OPERATING ROOM | Facility: HOSPITAL | Age: 24
End: 2024-11-20
Payer: MEDICAID

## 2024-11-20 VITALS
WEIGHT: 180 LBS | RESPIRATION RATE: 18 BRPM | TEMPERATURE: 98.4 F | SYSTOLIC BLOOD PRESSURE: 118 MMHG | OXYGEN SATURATION: 100 % | HEART RATE: 53 BPM | BODY MASS INDEX: 27.37 KG/M2 | DIASTOLIC BLOOD PRESSURE: 55 MMHG

## 2024-11-20 DIAGNOSIS — N30.10 INTERSTITIAL CYSTITIS: Primary | ICD-10-CM

## 2024-11-20 LAB — PREGNANCY TEST URINE, POC: NEGATIVE

## 2024-11-20 PROCEDURE — 88305 TISSUE EXAM BY PATHOLOGIST: CPT | Mod: TC,PARLAB | Performed by: OBSTETRICS & GYNECOLOGY

## 2024-11-20 PROCEDURE — 7100000009 HC PHASE TWO TIME - INITIAL BASE CHARGE: Performed by: OBSTETRICS & GYNECOLOGY

## 2024-11-20 PROCEDURE — 81025 URINE PREGNANCY TEST: CPT | Performed by: STUDENT IN AN ORGANIZED HEALTH CARE EDUCATION/TRAINING PROGRAM

## 2024-11-20 PROCEDURE — 3700000001 HC GENERAL ANESTHESIA TIME - INITIAL BASE CHARGE: Performed by: OBSTETRICS & GYNECOLOGY

## 2024-11-20 PROCEDURE — 3700000002 HC GENERAL ANESTHESIA TIME - EACH INCREMENTAL 1 MINUTE: Performed by: OBSTETRICS & GYNECOLOGY

## 2024-11-20 PROCEDURE — 88305 TISSUE EXAM BY PATHOLOGIST: CPT | Performed by: STUDENT IN AN ORGANIZED HEALTH CARE EDUCATION/TRAINING PROGRAM

## 2024-11-20 PROCEDURE — 7100000010 HC PHASE TWO TIME - EACH INCREMENTAL 1 MINUTE: Performed by: OBSTETRICS & GYNECOLOGY

## 2024-11-20 PROCEDURE — A52204 PR CYSTOURETHROSCOPY,BIOPSY: Performed by: ANESTHESIOLOGY

## 2024-11-20 PROCEDURE — 2500000004 HC RX 250 GENERAL PHARMACY W/ HCPCS (ALT 636 FOR OP/ED): Performed by: ANESTHESIOLOGY

## 2024-11-20 PROCEDURE — 3600000008 HC OR TIME - EACH INCREMENTAL 1 MINUTE - PROCEDURE LEVEL THREE: Performed by: OBSTETRICS & GYNECOLOGY

## 2024-11-20 PROCEDURE — A52204 PR CYSTOURETHROSCOPY,BIOPSY: Performed by: NURSE ANESTHETIST, CERTIFIED REGISTERED

## 2024-11-20 PROCEDURE — 3600000003 HC OR TIME - INITIAL BASE CHARGE - PROCEDURE LEVEL THREE: Performed by: OBSTETRICS & GYNECOLOGY

## 2024-11-20 PROCEDURE — 52204 CYSTOSCOPY W/BIOPSY(S): CPT | Performed by: OBSTETRICS & GYNECOLOGY

## 2024-11-20 PROCEDURE — 2500000004 HC RX 250 GENERAL PHARMACY W/ HCPCS (ALT 636 FOR OP/ED): Performed by: NURSE ANESTHETIST, CERTIFIED REGISTERED

## 2024-11-20 RX ORDER — MIDAZOLAM HYDROCHLORIDE 1 MG/ML
INJECTION, SOLUTION INTRAMUSCULAR; INTRAVENOUS AS NEEDED
Status: DISCONTINUED | OUTPATIENT
Start: 2024-11-20 | End: 2024-11-20

## 2024-11-20 RX ORDER — MEPERIDINE HYDROCHLORIDE 25 MG/ML
12.5 INJECTION INTRAMUSCULAR; INTRAVENOUS; SUBCUTANEOUS EVERY 10 MIN PRN
Status: DISCONTINUED | OUTPATIENT
Start: 2024-11-20 | End: 2024-11-20 | Stop reason: HOSPADM

## 2024-11-20 RX ORDER — ALBUTEROL SULFATE 0.83 MG/ML
2.5 SOLUTION RESPIRATORY (INHALATION) ONCE AS NEEDED
Status: DISCONTINUED | OUTPATIENT
Start: 2024-11-20 | End: 2024-11-20 | Stop reason: HOSPADM

## 2024-11-20 RX ORDER — PROPOFOL 10 MG/ML
INJECTION, EMULSION INTRAVENOUS CONTINUOUS PRN
Status: DISCONTINUED | OUTPATIENT
Start: 2024-11-20 | End: 2024-11-20

## 2024-11-20 RX ORDER — DEXAMETHASONE SODIUM PHOSPHATE 10 MG/ML
6 INJECTION INTRAMUSCULAR; INTRAVENOUS ONCE
Status: DISCONTINUED | OUTPATIENT
Start: 2024-11-20 | End: 2024-11-20 | Stop reason: HOSPADM

## 2024-11-20 RX ORDER — FENTANYL CITRATE 50 UG/ML
INJECTION, SOLUTION INTRAMUSCULAR; INTRAVENOUS AS NEEDED
Status: DISCONTINUED | OUTPATIENT
Start: 2024-11-20 | End: 2024-11-20

## 2024-11-20 RX ORDER — ACETAMINOPHEN 325 MG/1
650 TABLET ORAL EVERY 4 HOURS PRN
Status: DISCONTINUED | OUTPATIENT
Start: 2024-11-20 | End: 2024-11-20 | Stop reason: HOSPADM

## 2024-11-20 RX ORDER — CEFAZOLIN 1 G/1
INJECTION, POWDER, FOR SOLUTION INTRAVENOUS AS NEEDED
Status: DISCONTINUED | OUTPATIENT
Start: 2024-11-20 | End: 2024-11-20

## 2024-11-20 RX ORDER — ONDANSETRON HYDROCHLORIDE 2 MG/ML
4 INJECTION, SOLUTION INTRAVENOUS ONCE AS NEEDED
Status: DISCONTINUED | OUTPATIENT
Start: 2024-11-20 | End: 2024-11-20 | Stop reason: HOSPADM

## 2024-11-20 RX ORDER — CEFAZOLIN SODIUM 2 G/100ML
2 INJECTION, SOLUTION INTRAVENOUS ONCE
Status: DISCONTINUED | OUTPATIENT
Start: 2024-11-20 | End: 2024-11-20 | Stop reason: HOSPADM

## 2024-11-20 RX ORDER — ONDANSETRON HYDROCHLORIDE 2 MG/ML
INJECTION, SOLUTION INTRAVENOUS AS NEEDED
Status: DISCONTINUED | OUTPATIENT
Start: 2024-11-20 | End: 2024-11-20

## 2024-11-20 RX ORDER — SOLIFENACIN SUCCINATE 5 MG/1
5 TABLET, FILM COATED ORAL DAILY
Qty: 30 TABLET | Refills: 5 | Status: SHIPPED | OUTPATIENT
Start: 2024-11-20

## 2024-11-20 RX ORDER — LIDOCAINE HYDROCHLORIDE 20 MG/ML
INJECTION, SOLUTION EPIDURAL; INFILTRATION; INTRACAUDAL; PERINEURAL AS NEEDED
Status: DISCONTINUED | OUTPATIENT
Start: 2024-11-20 | End: 2024-11-20

## 2024-11-20 RX ORDER — PHENAZOPYRIDINE HYDROCHLORIDE 200 MG/1
200 TABLET, FILM COATED ORAL 3 TIMES DAILY PRN
Qty: 30 TABLET | Refills: 1 | Status: SHIPPED | OUTPATIENT
Start: 2024-11-20

## 2024-11-20 SDOH — HEALTH STABILITY: MENTAL HEALTH: CURRENT SMOKER: 0

## 2024-11-20 ASSESSMENT — PAIN SCALES - GENERAL
PAINLEVEL_OUTOF10: 0 - NO PAIN
PAINLEVEL_OUTOF10: 7
PAIN_LEVEL: 0

## 2024-11-20 ASSESSMENT — PAIN DESCRIPTION - LOCATION: LOCATION: PERINEUM

## 2024-11-20 ASSESSMENT — PAIN - FUNCTIONAL ASSESSMENT: PAIN_FUNCTIONAL_ASSESSMENT: 0-10

## 2024-11-20 ASSESSMENT — COLUMBIA-SUICIDE SEVERITY RATING SCALE - C-SSRS
2. HAVE YOU ACTUALLY HAD ANY THOUGHTS OF KILLING YOURSELF?: NO
6. HAVE YOU EVER DONE ANYTHING, STARTED TO DO ANYTHING, OR PREPARED TO DO ANYTHING TO END YOUR LIFE?: NO
1. IN THE PAST MONTH, HAVE YOU WISHED YOU WERE DEAD OR WISHED YOU COULD GO TO SLEEP AND NOT WAKE UP?: NO

## 2024-11-20 ASSESSMENT — ENCOUNTER SYMPTOMS
CHILLS: 0
FEVER: 0

## 2024-11-20 NOTE — DISCHARGE INSTRUCTIONS
What to Do at Home:  Some urinary bleeding may occur after the procedure. This bleeding is typically not heavy. You can take pain medication alternating between tylenol and ibuprofen every 3 hours. If you begin to feel constipated, take a dose of miralax. We also prescribed you pyridium for bladder pain and solifenacin to reduce bladder muscle spasm.

## 2024-11-20 NOTE — ANESTHESIA PREPROCEDURE EVALUATION
Patient: Gena Cuenca    Procedure Information       Date/Time: 11/20/24 1230    Procedure: CYSTOSCOPY WITH BLADDER BIOPSY - Cystoscopy w/Bladder Biopsy    Location: [facility] OR 02 / [facility]    Surgeons: Ange Summers MD MPH            Relevant Problems   Anesthesia (within normal limits)       Clinical information reviewed:    Allergies  Meds               NPO Detail:  NPO/Void Status  Date of Last Liquid: 11/20/24  Time of Last Liquid: 0930  Date of Last Solid: 11/19/24  Time of Last Solid: 2000  Last Intake Type: Clear fluids         Physical Exam    Airway  Mallampati: II  TM distance: >3 FB  Neck ROM: full     Cardiovascular - normal exam     Dental - normal exam     Pulmonary - normal exam     Abdominal - normal exam             Anesthesia Plan    History of general anesthesia?: yes  History of complications of general anesthesia?: no    ASA 2     MAC     The patient is not a current smoker.    intravenous induction   Anesthetic plan and risks discussed with patient.    Plan discussed with CRNA.

## 2024-11-20 NOTE — ANESTHESIA POSTPROCEDURE EVALUATION
Patient: Gena Cuenca    Procedure Summary       Date: 11/20/24 Room / Location: [facility] OR 12 / [facility]    Anesthesia Start: 1351 Anesthesia Stop: 1424    Procedure: CYSTOSCOPY WITH BLADDER BIOPSY Diagnosis:       Interstitial cystitis      (Interstitial cystitis [N30.10])    Surgeons: Ange Summers MD MPH Responsible Provider: Anya Mai MD    Anesthesia Type: MAC ASA Status: 2            Anesthesia Type: MAC    Vitals Value Taken Time   BP 98/57 11/20/24 1425   Temp 36.5 11/20/24 1429   Pulse 48 11/20/24 1428   Resp 16 11/20/24 1429   SpO2 100 % 11/20/24 1428   Vitals shown include unfiled device data.    Anesthesia Post Evaluation    Patient location during evaluation: PACU  Patient participation: complete - patient participated  Level of consciousness: awake and alert  Pain score: 0  Pain management: adequate  Airway patency: patent  Cardiovascular status: acceptable and hemodynamically stable  Respiratory status: acceptable, spontaneous ventilation and nasal cannula  Hydration status: acceptable  Postoperative Nausea and Vomiting: none        There were no known notable events for this encounter.

## 2024-11-20 NOTE — OP NOTE
CYSTOSCOPY WITH BLADDER BIOPSY Operative Note     Date: 2024  OR Location: [facility]    Name: Gena Cuenca, : 2000, Age: 24 y.o., MRN: [id number], Sex: female    Diagnosis  Pre-op Diagnosis      * Interstitial cystitis [N30.10] Post-op Diagnosis     * Interstitial cystitis [N30.10]     Procedures  CYSTOSCOPY WITH BLADDER BIOPSY  32701 - MS CYSTOURETHROSCOPY WITH BIOPSY      Surgeons      * Ange Summers - Primary    Resident/Fellow/Other Assistant:  Dr. Cecilia Brooke  Staff:   Circulator: Kanwal Monaco Scrub: Iwona  Scrub Person: Jyotsna    Anesthesia Staff: Anesthesiologist: Anya Mai MD  CRNA: JESÚS Montelongo-CRNA    Procedure Summary  Anesthesia: Monitor Anesthesia Care  ASA: II  Estimated Blood Loss: 2mL  Intra-op Medications:   Administrations occurring from 1330 to 1430 on 24:   Medication Name Total Dose   ceFAZolin (Ancef) vial 1 g 2 g   fentaNYL (Sublimaze) injection 50 mcg/mL 50 mcg   lidocaine PF (Xylocaine-MPF) local injection 2 % 40 mg   midazolam (Versed) injection 1 mg/mL 2 mg   ondansetron (Zofran) 2 mg/mL injection 4 mg   propofol (Diprivan) injection 10 mg/mL 286.44 mg              Anesthesia Record               Intraprocedure I/O Totals       None           Specimen:   ID Type Source Tests Collected by Time   1 : BLADDER BIOPSY Tissue BLADDER BIOPSY SURGICAL PATHOLOGY EXAM Ange Summers MD [facility] 2024 1411                 Drains and/or Catheters: * None in log *    Tourniquet Times:         Findings: Increase in HR with 600 ml of sterile water in the bladder, moderate trabeculation, increase in vascularity with increase in fluid filling. No hunner's lesions.    Indications: Gena Cuenca is an 24 y.o. female who is having surgery for Interstitial cystitis [N30.10].     The patient was seen in the preoperative area. The risks, benefits, complications, treatment options, non-operative alternatives, expected recovery and outcomes were discussed with the patient. The  possibilities of reaction to medication, pulmonary aspiration, injury to surrounding structures, bleeding, recurrent infection, the need for additional procedures, failure to diagnose a condition, and creating a complication requiring transfusion or operation were discussed with the patient. The patient concurred with the proposed plan, giving informed consent.  The site of surgery was properly noted/marked if necessary per policy. The patient has been actively warmed in preoperative area. Preoperative antibiotics have been ordered and given within 1 hours of incision. Venous thrombosis prophylaxis have been ordered including bilateral sequential compression devices    Procedure Details: After anesthesia was found to be adequate, the cystoscope was white balanced and gently inserted into the urethra. The bladder was filled with normal saline and the bladder carefully inspected. Bilateral ureteral orifices were normal and effluxed normally. There were no Hunner's lesions however the vascularity and trabeculation that became more pronounced with increased fluid filling is c/w interstitial cystitis. There were no foreign bodies or particulate matter. A small biopsy was taken from the left bladder wall. No ongoing bleeding was appreciated. Bladder was emptied.    Complications:  None; patient tolerated the procedure well.    Disposition: PACU - hemodynamically stable.  Condition: stable         Ange Summers  Phone Number: [phone]

## 2024-11-20 NOTE — H&P
History Of Present Illness  Gena Cuenca is a 24 y.o. female presenting with suspected Insterstitial cystitis for diagnostic cystoscopy. Will also plan for bladder biopsy since receiving neurology workup for possible amyloidosis.      Past Medical History  She has a past medical history of Interstitial cystitis.    Surgical History  She has no past surgical history on file.     Social History  She reports that she has been smoking cigarettes. She has never been exposed to tobacco smoke. She has never used smokeless tobacco. She reports that she does not currently use alcohol. She reports that she does not currently use drugs.    Family History  No family history on file.     Allergies  Iodine    Review of Systems   Constitutional:  Negative for chills and fever.   All other systems reviewed and are negative.         Physical Exam   Con: awake, alert, NAD  HEENT: normocephalic, speech normal  CV: no peripheral edema  Resp: no increased work of breathing  Neuro: normal mentation  Psych: mood normal  Skin: no rash      Last Recorded Vitals  Blood pressure 119/82, pulse 63, temperature 36.9 °C (98.4 °F), temperature source Temporal, resp. rate 18, weight 81.6 kg (180 lb), SpO2 100%.    Relevant Results    No results found for this or any previous visit (from the past 24 hours).     Assessment/Plan   Assessment & Plan  Interstitial cystitis      To OR when ready    Cecilia Brooke MD, Gynecologist  Female Reconstruction & Sexual Medicine Fellow  Dept of Urology/OBGYN  11/20/2024

## 2024-11-21 ENCOUNTER — APPOINTMENT (OUTPATIENT)
Dept: UROLOGY | Facility: CLINIC | Age: 24
End: 2024-11-21
Payer: MEDICAID

## 2024-11-29 LAB
LABORATORY COMMENT REPORT: NORMAL
PATH REPORT.FINAL DX SPEC: NORMAL
PATH REPORT.GROSS SPEC: NORMAL
PATH REPORT.RELEVANT HX SPEC: NORMAL
PATH REPORT.TOTAL CANCER: NORMAL

## 2024-12-04 PROBLEM — I51.7 CARDIOMEGALY: Status: ACTIVE | Noted: 2024-12-04

## 2024-12-04 PROBLEM — M22.2X2 PATELLOFEMORAL DISORDERS, LEFT KNEE: Status: ACTIVE | Noted: 2024-12-04

## 2024-12-04 RX ORDER — ESTRADIOL MICRONIZED 100 %
POWDER (GRAM) MISCELLANEOUS
COMMUNITY
Start: 2024-10-11

## 2024-12-05 ENCOUNTER — OFFICE VISIT (OUTPATIENT)
Dept: VASCULAR SURGERY | Facility: HOSPITAL | Age: 24
End: 2024-12-05
Payer: MEDICAID

## 2024-12-05 ENCOUNTER — DOCUMENTATION (OUTPATIENT)
Dept: TRANSPLANT | Facility: HOSPITAL | Age: 24
End: 2024-12-05

## 2024-12-05 VITALS
SYSTOLIC BLOOD PRESSURE: 110 MMHG | HEIGHT: 68 IN | DIASTOLIC BLOOD PRESSURE: 72 MMHG | BODY MASS INDEX: 26.55 KG/M2 | WEIGHT: 175.2 LBS | HEART RATE: 72 BPM

## 2024-12-05 DIAGNOSIS — N94.89 PELVIC CONGESTION SYNDROME: Primary | ICD-10-CM

## 2024-12-05 PROCEDURE — 99214 OFFICE O/P EST MOD 30 MIN: CPT | Performed by: SURGERY

## 2024-12-05 PROCEDURE — 3008F BODY MASS INDEX DOCD: CPT | Performed by: SURGERY

## 2024-12-05 PROCEDURE — 99204 OFFICE O/P NEW MOD 45 MIN: CPT | Performed by: SURGERY

## 2024-12-05 RX ORDER — CEPHALEXIN 500 MG/1
1 CAPSULE ORAL
COMMUNITY
Start: 2024-12-01

## 2024-12-05 ASSESSMENT — PATIENT HEALTH QUESTIONNAIRE - PHQ9
2. FEELING DOWN, DEPRESSED OR HOPELESS: NOT AT ALL
SUM OF ALL RESPONSES TO PHQ9 QUESTIONS 1 AND 2: 0
1. LITTLE INTEREST OR PLEASURE IN DOING THINGS: NOT AT ALL

## 2024-12-05 ASSESSMENT — ENCOUNTER SYMPTOMS
OCCASIONAL FEELINGS OF UNSTEADINESS: 0
DEPRESSION: 0
LOSS OF SENSATION IN FEET: 0

## 2024-12-05 ASSESSMENT — PAIN SCALES - GENERAL: PAINLEVEL_OUTOF10: 0-NO PAIN

## 2024-12-05 NOTE — PROGRESS NOTES
"NPV REASON: Here for evaluation of pelvic congestion syndrome.    CURRENT ENCOUNTER:  Gena Cuenca is 24 y.o. female Here for evaluation of pelvic congestion syndrome.  Referred by Dr. Stevenson.  Had an US and Ct scan done per the patient and says she was told she has pelvic congestion syndrome.     Reports \"abdominal\" pain - describes lower abdominal pain  Has it sporadically  Has it every other day  Unsure what brings on the pain   Feels that they are sharp  Not coinciding with cycles  Does not worsen with cycles  Has had this for \"a while\" since  -   Has a follow up  in December - only has had histamines prescribed - making her very tired - so still trying to see what her next therapy would be.   Has burning with urination  Pain scale says 2/3 at its worst  There are periods when she has 0/10 pain.       One  and one miscarriage  Last birth was in    was in   Pain did not worsen with pregnancies.     Sometimes has pain with intercourse  And post-coital but not every time   Can last 30 minutes    Cycles are fairly normal per the patient  Saw Dr. Stevenson, referred for leg varices/pain  No prior vein procedure    On 2024 she underwent cystoscopy with bladder biopsy by Dr. Summers -Per operative report appeared to be evidence of interstitial cystitis.  She was evaluated in October by urology proceeding this for vulvar burning following second vaginal delivery .      PastMedHX:  Past Medical History:   Diagnosis Date    Interstitial cystitis      Family history of amyloidosis Polyarthralgia     Meds:     Current Outpatient Medications:     cephalexin (Keflex) 500 mg capsule, Take 1 capsule (500 mg) by mouth every 12 hours., Disp: , Rfl:     gabapentin (Neurontin) 100 mg capsule, Take 2 capsules (200 mg) by mouth once daily., Disp: , Rfl:     phenazopyridine (Pyridium) 200 mg tablet, Take 1 tablet (200 mg) by mouth 3 times a day as needed for bladder spasms. Do not take " more than 3 days in a row, Disp: 30 tablet, Rfl: 1    estradiol micronized (estradiol, bulk,) 100 % powder, , Disp: , Rfl:     solifenacin (VESIcare) 5 mg tablet, Take 1 tablet (5 mg) by mouth once daily. Swallow tablet whole; do not crush, chew, or split. (Patient not taking: Reported on 12/5/2024), Disp: 30 tablet, Rfl: 5    Allergies:   Allergies   Allergen Reactions    Iodine Hives and Itching     Facial hives, pruritus     Facial hives, pruritus       Facial hives, pruritus    Facial hives, pruritus       ROS:  Review of Systems     Objective:  Vitals:  Vitals:    12/05/24 1141   BP: 110/72   Pulse:         Exam:    No distress  Breathing comfortably   Not tachycardic  Palpable bilateral radial pulses  Abd soft, nt, nd  B/l legs with intact pulses and well perfused feet  No leg edema  No evidence of venous disease on exam  Deferred vaginal exam as patient reports no issues with VV in that area.     Labs:  Lab Results   Component Value Date    HGB 14.2 11/14/2024    HCT 43.1 11/14/2024     Lab Results   Component Value Date    CREATININE 0.92 11/14/2024    BUN 12 11/14/2024     11/14/2024    K 4.2 11/14/2024     11/14/2024    CO2 26 11/14/2024         Imaging:  Only have reports available - no images to review  6/16/2024: vascular duplex/transvaginal US  1.  Mildly enlarged and somewhat heterogeneous uterus.  No uterine mass.   Normal appearance of the endometrium.   2.  Normal appearance of the bilateral ovaries.  There are no adnexal masses.   Normal Doppler flow within the ovaries.   3.  Mildly prominent bilateral adnexal vascularity.     2021: CT A/P  IMPRESSION: The etiology of patient's symptoms is not certain. No abscess free   air or definite obstruction. Prominent vessels of the pelvis suspicious for   pelvic congestion. Constipation also suspected. Please correlate clinically.     **States she had hives with the contrast    Assessment & Plan:  Gena Cuenca is 24 y.o. female with  interstitial cystitis undergoing current treatment/management.   Has pelvic varices per reports (no imaging available) but appear to generally be asymptomatic at this time.     Main complaints, in order, related to   1) urinary sx  2) intermittent abdominal discomfort  3) pain with intercourse and post coital pain    I think at this point her main symptoms are urologic/urinary.  Her pain pattern is not consistent with 1 of venous origin from the pelvis such as pelvic congestion syndrome.  She certainly sounds like has dilated veins based on imaging reports.  We will proceed with a transabdominal duplex in the vascular lab to evaluate this and confirm.  Of note, the patient expressed concerns regarding her safety and  was called to facilitate management and recommendations and support.  At the end the patient felt safe returning home and was thankful.  I will see her back at [place] with the ultrasound on the same day so that we can discuss results.    Shannan Ornelas MD, MHS, RPVI  , [employer]  Director, [facility], [facility]  Co-Director, Vascular Laboratories, [facility]  Division of Vascular Surgery and Endovascular Therapy  [facility]
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
## 2024-12-18 NOTE — PROGRESS NOTES
FOLLOW-UP VISIT: Telemedicine     HISTORY OF PRESENT ILLNESS:   Gena Cuenca is a 24 y.o. female who presents to me today for a postoperative visit to follow up on cystoscopy under anesthesia. Patient is s/p cystoscopy with bladder biopsy on 11/20/24.  Since surgery had UTI symptoms Dec 1 and was seen in urgent care; symptoms persisted despite completing antibiotic. Reports had side effects with hydroxyzine giving her sleep paralysis so stopped it. Tried Vesicare for a few days then stopped it due to not knowing to continue it.    Surgical findings:   Findings: Increase in HR with 600 ml of sterile water in the bladder, moderate trabeculation, increase in vascularity with increase in fluid filling. No hunner's lesions.     A. BLADDER, BIOPSY:    -- Extremely minute strips of urothelium with no significant diagnostic abnormality.      PAST MEDICAL HISTORY:  Past Medical History:   Diagnosis Date    Interstitial cystitis        PAST SURGICAL HISTORY:  No past surgical history on file.     ALLERGIES:   Allergies   Allergen Reactions    Iodine Hives and Itching     Facial hives, pruritus     Facial hives, pruritus       Facial hives, pruritus    Facial hives, pruritus        MEDICATIONS:   Medication Documentation Review Audit       Reviewed by Blaire Cowan MA (Medical Assistant) on 12/05/24 at 1137      Medication Order Taking? Sig Documenting Provider Last Dose Status   cephalexin (Keflex) 500 mg capsule [id number] Yes Take 1 capsule (500 mg) by mouth every 12 hours. Historical Provider, MD  Active   estradiol micronized (estradiol, bulk,) 100 % powder [id number]     Patient not taking: Reported on 12/5/2024    Historical Provider, MD  Active   gabapentin (Neurontin) 100 mg capsule [id number] Yes Take 2 capsules (200 mg) by mouth once daily. Historical Provider, MD  Active   phenazopyridine (Pyridium) 200 mg tablet [id number] Yes Take 1 tablet (200 mg) by mouth 3 times a day as needed for bladder spasms. Do not  take more than 3 days in a row Cecilia Brooke MD  Active   solifenacin (VESIcare) 5 mg tablet [id number]  Take 1 tablet (5 mg) by mouth once daily. Swallow tablet whole; do not crush, chew, or split.   Patient not taking: Reported on 12/5/2024    Cecilia Brooke MD  Active                     SOCIAL HISTORY:  Patient  reports that she has been smoking cigarettes. She started smoking about 9 years ago. She has a 10 pack-year smoking history. She has never been exposed to tobacco smoke. She has never used smokeless tobacco. She reports that she does not currently use alcohol. She reports that she does not currently use drugs.   Social History     Socioeconomic History    Marital status: Single     Spouse name: Not on file    Number of children: Not on file    Years of education: Not on file    Highest education level: Not on file   Occupational History    Not on file   Tobacco Use    Smoking status: Every Day     Current packs/day: 1.00     Average packs/day: 1 pack/day for 10.0 years (10.0 ttl pk-yrs)     Types: Cigarettes     Start date: 2015     Passive exposure: Never    Smokeless tobacco: Never   Substance and Sexual Activity    Alcohol use: Not Currently    Drug use: Not Currently    Sexual activity: Not on file   Other Topics Concern    Not on file   Social History Narrative    Not on file     Social Drivers of Health     Financial Resource Strain: Low Risk  (2/12/2024)    Received from [facility]    Overall Financial Resource Strain (CARDIA)     Difficulty of Paying Living Expenses: Not hard at all   Food Insecurity: No Food Insecurity (2/12/2024)    Received from [facility]    Hunger Vital Sign     Worried About Running Out of Food in the Last Year: Never true     Ran Out of Food in the Last Year: Never true   Transportation Needs: Unknown (2/12/2024)    [facility]    PRAPARE - Transportation     Lack of Transportation (Medical): Not on file     Lack of Transportation (Non-Medical): No   Physical Activity: Inactive (2/3/2023)    Received [facility]    Exercise Vital Sign     Days of Exercise per Week: 0 days     Minutes of Exercise per Session: 0 min   Stress: Not on file   Social Connections: Moderately Isolated (8/24/2023)    [facility], [facility]    Social Connection and Isolation Panel [NHANES]     Frequency of Communication with Friends and Family: More than three times a week     Frequency of Social Gatherings with Friends and Family: Not on file     Attends Holiness Services: Never     Active Member of Clubs or Organizations: No     Attends Club or Organization Meetings: Not on file     Marital Status: Living with partner   Intimate Partner Violence: Not At Risk (2/3/2023)    [facility]    Humiliation, Afraid, Rape, and Kick questionnaire     Fear of Current or Ex-Partner: No     Emotionally Abused: No     Physically Abused: No     Sexually Abused: No   Housing Stability: Unknown (2/12/2024)    [facility]    Housing Stability Vital Sign     Unable to Pay for Housing in the Last Year: Not on file     Number of Places Lived in the Last Year: Not on file     Unstable Housing in the Last Year: No       FAMILY HISTORY:  No family history on file.    REVIEW OF SYSTEMS:  Constitutional: Negative for fever and chills. Denies anorexia, weight loss.  Eyes: Negative for visual disturbance.   Respiratory: Negative for shortness of breath.    Cardiovascular: Negative for chest pain.   Gastrointestinal: Negative for nausea and vomiting.   Genitourinary: See interval history above.  Skin: Negative for rash.    Neurological: Negative for dizziness and numbness.   Psychiatric/Behavioral: Negative for confusion and decreased concentration.     PHYSICAL EXAM:  There were no vitals taken for this visit. Due to telephone visit  Con: awake, alert, NAD  HEENT:  speech normal  Resp: no increased work of breathing  Neuro: normal mentation  Psych: mood normal      PATHOLOGY REVIEW:  FINAL DIAGNOSIS   A. BLADDER, BIOPSY:    -- Extremely minute strips of urothelium with no significant diagnostic abnormality.          Assessment:      No diagnosis found.    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      Plan:   Standing urine cx to evaluate for UTI vs IC flare   Cystoscopy consistent with interstitial cystitis and bx benign without evidence of amyloidosis.   Discussed smoking cessation and dietary changes however pt reports not able to do these at this time. Discussed possibility of lower dose hydroxyzine however pt wishes to try alternate options. Recommend vesicare 5mg daily and bladder instillations.  Has not yet started estrogen-testosterone for vestibulodynia  Pap cotest up to date; wnl 2024    Follow up in 3 months    Cecilia Brooke MD, Gynecologist  Female Reconstruction & Sexual Medicine Fellow  Dept of Urology/OBGYN  12/19/2024

## 2024-12-19 ENCOUNTER — APPOINTMENT (OUTPATIENT)
Dept: UROLOGY | Facility: CLINIC | Age: 24
End: 2024-12-19
Payer: MEDICAID

## 2024-12-19 DIAGNOSIS — R39.9 UTI SYMPTOMS: Primary | ICD-10-CM

## 2024-12-19 DIAGNOSIS — N30.10 INTERSTITIAL CYSTITIS: ICD-10-CM

## 2024-12-19 PROCEDURE — 99024 POSTOP FOLLOW-UP VISIT: CPT | Performed by: OBSTETRICS & GYNECOLOGY

## 2024-12-30 ENCOUNTER — LAB (OUTPATIENT)
Dept: LAB | Facility: LAB | Age: 24
End: 2024-12-30
Payer: MEDICAID

## 2024-12-30 DIAGNOSIS — R10.2 PELVIC PAIN: ICD-10-CM

## 2024-12-30 DIAGNOSIS — N94.810 VULVAR VESTIBULITIS: ICD-10-CM

## 2024-12-30 DIAGNOSIS — N81.89 PELVIC FLOOR WEAKNESS: ICD-10-CM

## 2024-12-30 DIAGNOSIS — R39.9 UTI SYMPTOMS: ICD-10-CM

## 2024-12-30 LAB
APPEARANCE UR: CLEAR
BACTERIA #/AREA URNS AUTO: ABNORMAL /HPF
BILIRUB UR STRIP.AUTO-MCNC: NEGATIVE MG/DL
COLOR UR: ABNORMAL
GLUCOSE UR STRIP.AUTO-MCNC: NORMAL MG/DL
HOLD SPECIMEN: NORMAL
KETONES UR STRIP.AUTO-MCNC: ABNORMAL MG/DL
LEUKOCYTE ESTERASE UR QL STRIP.AUTO: ABNORMAL
MUCOUS THREADS #/AREA URNS AUTO: ABNORMAL /LPF
NITRITE UR QL STRIP.AUTO: NEGATIVE
PH UR STRIP.AUTO: 7 [PH]
PROT UR STRIP.AUTO-MCNC: NEGATIVE MG/DL
RBC # UR STRIP.AUTO: NEGATIVE /UL
RBC #/AREA URNS AUTO: ABNORMAL /HPF
SP GR UR STRIP.AUTO: 1.02
SQUAMOUS #/AREA URNS AUTO: ABNORMAL /HPF
UROBILINOGEN UR STRIP.AUTO-MCNC: NORMAL MG/DL
WBC #/AREA URNS AUTO: ABNORMAL /HPF

## 2024-12-30 PROCEDURE — 81001 URINALYSIS AUTO W/SCOPE: CPT

## 2024-12-30 PROCEDURE — 87086 URINE CULTURE/COLONY COUNT: CPT

## 2024-12-31 LAB — BACTERIA UR CULT: NORMAL

## 2025-01-08 NOTE — PROGRESS NOTES
History Of Present Illness  Gena Cuenca is a 24 y.o. female presenting for follow up of vulvodynia and interstitial cystitis. Had side effects with hydroxyzine giving her sleep paralysis so stopped it. Tried Vesicare, no improvement. Has not yet vaginal estrogen-testosterone cream. Last week had a lot of abdominal pain that has since improved, had pink tinged urine. Feels urethral burning. No vaginal discharge or vaginitis symptoms. No apparent trigger for flare last week.     Prior cystoscopy findings: Increase in HR with 600 ml of sterile water in the bladder, moderate trabeculation, increase in vascularity with increase in fluid filling. No hunner's lesions.     Prior history:  Burning pain to vulva when touched since second vaginal delivery in 2022. No hx of major pelvic infections. Has not yet tried anything for the pain; does not feel that she would be able to do PFPT since she cannot do schedule of weekly appts. She is sexually active and notes pain on deep penetration as well as insertion related pain. +vaginal dryness both daily and during sexual activity. She denies any vaginal complaints, no abnormal vaginal bleeding or discharge. No hx of sexual trauma or abuse, feels good about current relationship.+dysuria, does note was diagnosed with interstitial cystitis and has standing order for urine cx when she has symptoms. She is a smoker, smokes a pack a day. She has normal menstrual cycle. Has not noticed any changed with her pain surrounding menstruation and no dyschezia      had vasectomy for contraception         Past Medical History  She has a past medical history of Interstitial cystitis.    Surgical History  She has no past surgical history on file.     Social History  She reports that she has been smoking cigarettes. She started smoking about 10 years ago. She has a 10 pack-year smoking history. She has never been exposed to tobacco smoke. She has never used smokeless tobacco. She reports  "that she does not currently use alcohol. She reports that she does not currently use drugs.    Family History  No family history on file.     Allergies  Iodine    Review of Systems   Constitutional:  Negative for chills and fever.   Genitourinary:  Negative for vaginal discharge and vaginal pain.   All other systems reviewed and are negative.       Physical Exam   Con: awake, alert, NAD  HEENT: normocephalic, speech normal  CV: no peripheral edema  Resp: no increased work of breathing  Neuro: normal mentation  Psych: mood normal  Skin: no rash    Last Recorded Vitals  Temperature 36.1 °C (96.9 °F), height 1.727 m (5' 8\"), weight 78.7 kg (173 lb 9.6 oz).    Relevant Results    Results for orders placed or performed in visit on 01/10/25 (from the past 24 hours)   POCT UA Automated manually resulted   Result Value Ref Range    POC Color, Urine Yellow Straw, Yellow, Light-Yellow    POC Appearance, Urine Clear Clear    POC Glucose, Urine NEGATIVE NEGATIVE mg/dl    POC Bilirubin, Urine NEGATIVE NEGATIVE    POC Ketones, Urine NEGATIVE NEGATIVE mg/dl    POC Specific Gravity, Urine 1.020 1.005 - 1.035    POC Blood, Urine TRACE-Intact (A) NEGATIVE    POC PH, Urine 7.0 No Reference Range Established PH    POC Protein, Urine NEGATIVE NEGATIVE mg/dl    POC Urobilinogen, Urine 0.2 0.2, 1.0 EU/DL    Poc Nitrite, Urine NEGATIVE NEGATIVE    POC Leukocytes, Urine SMALL (1+) (A) NEGATIVE            Assessment/Plan     Standing urine cx for assessment as urinary symptoms arise. Discussed dietary/lifestyle changes at prior visits. UA/cx collected today due to urine dip with trace blood   Had reaction to hydroxyzine, previously declined possibility of lower dose. No improvement with vesicare. Will trial amitriptylene. Currently scheduled for bladder instillations.  Has not yet started estrogen-testosterone for vestibulodynia, recommend starting  Pap cotest up to date; wnl 2024    Scheduled for follow up in March    Cecilia Brooke MD, " Gynecologist  Female Reconstruction & Sexual Medicine Fellow  Dept of Urology/OBGYN  1/10/2025

## 2025-01-10 ENCOUNTER — APPOINTMENT (OUTPATIENT)
Dept: UROLOGY | Facility: CLINIC | Age: 25
End: 2025-01-10
Payer: MEDICAID

## 2025-01-10 VITALS — TEMPERATURE: 96.9 F | BODY MASS INDEX: 26.31 KG/M2 | HEIGHT: 68 IN | WEIGHT: 173.6 LBS

## 2025-01-10 DIAGNOSIS — R31.29 MICROSCOPIC HEMATURIA: ICD-10-CM

## 2025-01-10 DIAGNOSIS — N30.10 INTERSTITIAL CYSTITIS: Primary | ICD-10-CM

## 2025-01-10 LAB
POC APPEARANCE, URINE: CLEAR
POC BILIRUBIN, URINE: NEGATIVE
POC BLOOD, URINE: ABNORMAL
POC COLOR, URINE: YELLOW
POC GLUCOSE, URINE: NEGATIVE MG/DL
POC KETONES, URINE: NEGATIVE MG/DL
POC LEUKOCYTES, URINE: ABNORMAL
POC NITRITE,URINE: NEGATIVE
POC PH, URINE: 7 PH
POC PROTEIN, URINE: NEGATIVE MG/DL
POC SPECIFIC GRAVITY, URINE: 1.02
POC UROBILINOGEN, URINE: 0.2 EU/DL

## 2025-01-10 PROCEDURE — 99214 OFFICE O/P EST MOD 30 MIN: CPT | Performed by: STUDENT IN AN ORGANIZED HEALTH CARE EDUCATION/TRAINING PROGRAM

## 2025-01-10 PROCEDURE — 81001 URINALYSIS AUTO W/SCOPE: CPT

## 2025-01-10 PROCEDURE — 81003 URINALYSIS AUTO W/O SCOPE: CPT | Performed by: STUDENT IN AN ORGANIZED HEALTH CARE EDUCATION/TRAINING PROGRAM

## 2025-01-10 PROCEDURE — 3008F BODY MASS INDEX DOCD: CPT | Performed by: STUDENT IN AN ORGANIZED HEALTH CARE EDUCATION/TRAINING PROGRAM

## 2025-01-10 PROCEDURE — 87086 URINE CULTURE/COLONY COUNT: CPT

## 2025-01-10 RX ORDER — AMITRIPTYLINE HYDROCHLORIDE 25 MG/1
25 TABLET, FILM COATED ORAL NIGHTLY
Qty: 30 TABLET | Refills: 11 | Status: SHIPPED | OUTPATIENT
Start: 2025-01-10 | End: 2026-01-10

## 2025-01-10 ASSESSMENT — ENCOUNTER SYMPTOMS
CHILLS: 0
FEVER: 0

## 2025-01-10 ASSESSMENT — PAIN SCALES - GENERAL: PAINLEVEL_OUTOF10: 0-NO PAIN

## 2025-01-11 LAB
APPEARANCE UR: CLEAR
BILIRUB UR STRIP.AUTO-MCNC: NEGATIVE MG/DL
COLOR UR: ABNORMAL
GLUCOSE UR STRIP.AUTO-MCNC: NORMAL MG/DL
KETONES UR STRIP.AUTO-MCNC: NEGATIVE MG/DL
LEUKOCYTE ESTERASE UR QL STRIP.AUTO: ABNORMAL
MUCOUS THREADS #/AREA URNS AUTO: NORMAL /LPF
NITRITE UR QL STRIP.AUTO: NEGATIVE
PH UR STRIP.AUTO: 6.5 [PH]
PROT UR STRIP.AUTO-MCNC: NEGATIVE MG/DL
RBC # UR STRIP.AUTO: NEGATIVE /UL
RBC #/AREA URNS AUTO: NORMAL /HPF
SP GR UR STRIP.AUTO: 1.01
SQUAMOUS #/AREA URNS AUTO: NORMAL /HPF
UROBILINOGEN UR STRIP.AUTO-MCNC: NORMAL MG/DL
WBC #/AREA URNS AUTO: NORMAL /HPF

## 2025-01-12 LAB — BACTERIA UR CULT: NO GROWTH

## 2025-01-27 ENCOUNTER — OFFICE VISIT (OUTPATIENT)
Dept: FAMILY MEDICINE CLINIC | Age: 25
End: 2025-01-27
Payer: MEDICAID

## 2025-01-27 VITALS
WEIGHT: 171.4 LBS | DIASTOLIC BLOOD PRESSURE: 70 MMHG | HEART RATE: 71 BPM | OXYGEN SATURATION: 98 % | HEIGHT: 68 IN | SYSTOLIC BLOOD PRESSURE: 100 MMHG | BODY MASS INDEX: 25.98 KG/M2

## 2025-01-27 DIAGNOSIS — Z3A.01 LESS THAN 8 WEEKS GESTATION OF PREGNANCY: ICD-10-CM

## 2025-01-27 DIAGNOSIS — B37.9 YEAST INFECTION: Primary | ICD-10-CM

## 2025-01-27 PROCEDURE — 1036F TOBACCO NON-USER: CPT | Performed by: NURSE PRACTITIONER

## 2025-01-27 PROCEDURE — G8419 CALC BMI OUT NRM PARAM NOF/U: HCPCS | Performed by: NURSE PRACTITIONER

## 2025-01-27 PROCEDURE — G8427 DOCREV CUR MEDS BY ELIG CLIN: HCPCS | Performed by: NURSE PRACTITIONER

## 2025-01-27 PROCEDURE — 99213 OFFICE O/P EST LOW 20 MIN: CPT | Performed by: NURSE PRACTITIONER

## 2025-01-27 RX ORDER — MICONAZOLE NITRATE 1200MG-2%
1 KIT VAGINAL NIGHTLY
Qty: 1 KIT | Refills: 0 | Status: SHIPPED | OUTPATIENT
Start: 2025-01-27 | End: 2025-02-03

## 2025-01-27 SDOH — ECONOMIC STABILITY: FOOD INSECURITY: WITHIN THE PAST 12 MONTHS, YOU WORRIED THAT YOUR FOOD WOULD RUN OUT BEFORE YOU GOT MONEY TO BUY MORE.: NEVER TRUE

## 2025-01-27 SDOH — ECONOMIC STABILITY: FOOD INSECURITY: WITHIN THE PAST 12 MONTHS, THE FOOD YOU BOUGHT JUST DIDN'T LAST AND YOU DIDN'T HAVE MONEY TO GET MORE.: NEVER TRUE

## 2025-01-27 ASSESSMENT — PATIENT HEALTH QUESTIONNAIRE - PHQ9: DEPRESSION UNABLE TO ASSESS: URGENT/EMERGENT SITUATION

## 2025-01-27 ASSESSMENT — ENCOUNTER SYMPTOMS
VOMITING: 0
ABDOMINAL PAIN: 0
WHEEZING: 0
DIARRHEA: 0
COUGH: 0
SHORTNESS OF BREATH: 0
BACK PAIN: 0
NAUSEA: 0

## 2025-01-27 NOTE — PROGRESS NOTES
Arcelia Haddad (:  2000) is a 24 y.o. female, Established patient, here for evaluation of the following chief complaint(s):  Other (Itching, white discharge x4days /Patient is six weeks pregnant )      Vitals:    25 1306   BP: 100/70   Pulse: 71   SpO2: 98%       ASSESSMENT/PLAN:  1. Yeast infection  -     Miconazole Nitrate-Wipes (MONISTAT 7 COMPLETE THERAPY) 100-2 MG-% KIT; Place 1 Application vaginally nightly for 7 days, Disp-1 kit, R-0Normal  2. Less than 8 weeks gestation of pregnancy        -     follow up with gyn, schedule appt      Return for GYN.      SUBJECTIVE/OBJECTIVE:    Vaginal Itching  The patient's primary symptoms include genital itching and vaginal discharge (white thick clumpy). This is a new problem. Episode onset: x2 days vaginal itching and discharge.  Pt 6 weeks pregnant. The problem occurs constantly. The problem has been gradually worsening. The pain is mild. Pertinent negatives include no abdominal pain, back pain, chills, diarrhea, dysuria, fever, frequency, nausea or vomiting. The vaginal discharge was thick and white. There has been no bleeding. She has not been passing clots. She has not been passing tissue. She has tried nothing for the symptoms.         Review of Systems   Constitutional:  Negative for chills, fatigue and fever.   Respiratory:  Negative for cough, shortness of breath and wheezing.    Cardiovascular:  Negative for chest pain and palpitations.   Gastrointestinal:  Negative for abdominal pain, diarrhea, nausea and vomiting.   Genitourinary:  Positive for vaginal discharge (white thick clumpy). Negative for dysuria, frequency and vaginal pain (vaginal itching).   Musculoskeletal:  Negative for arthralgias, back pain and myalgias.         Physical Exam  Vitals reviewed.   Constitutional:       General: She is not in acute distress.     Appearance: Normal appearance.   Cardiovascular:      Rate and Rhythm: Normal rate and regular rhythm.   Pulmonary:

## 2025-02-14 ENCOUNTER — APPOINTMENT (OUTPATIENT)
Dept: UROLOGY | Facility: CLINIC | Age: 25
End: 2025-02-14
Payer: MEDICAID

## 2025-03-04 ENCOUNTER — APPOINTMENT (OUTPATIENT)
Dept: OBSTETRICS AND GYNECOLOGY | Facility: CLINIC | Age: 25
End: 2025-03-04
Payer: MEDICAID

## 2025-03-21 ENCOUNTER — APPOINTMENT (OUTPATIENT)
Dept: UROLOGY | Facility: CLINIC | Age: 25
End: 2025-03-21
Payer: MEDICAID

## (undated) DEVICE — DRAPE, TIBURON, LITHOTOMY, W/FLUID CONTROL POUCH

## (undated) DEVICE — Device

## (undated) DEVICE — SLEEVE, VASO PRESS, CALF GARMENT, MEDIUM, GREEN